# Patient Record
Sex: FEMALE | Race: WHITE | NOT HISPANIC OR LATINO | Employment: FULL TIME | URBAN - METROPOLITAN AREA
[De-identification: names, ages, dates, MRNs, and addresses within clinical notes are randomized per-mention and may not be internally consistent; named-entity substitution may affect disease eponyms.]

---

## 2020-08-17 ENCOUNTER — TELEPHONE (OUTPATIENT)
Dept: GASTROENTEROLOGY | Facility: CLINIC | Age: 45
End: 2020-08-17

## 2020-08-17 NOTE — TELEPHONE ENCOUNTER
Called and spoke to pt.  Informed pt appt on 08'/17 will be cancelled due to provider being in procedures on this date.  Offered pt sooner appt with NP/fellow.  Pt declined and states she wants to be scheduled with Dr. Landon.  Pt rescheduled for next available being she can not be seen until after 9:30 am.  Pt appreciated the call.

## 2020-11-12 ENCOUNTER — OFFICE VISIT (OUTPATIENT)
Dept: GASTROENTEROLOGY | Facility: CLINIC | Age: 45
End: 2020-11-12
Payer: COMMERCIAL

## 2020-11-12 ENCOUNTER — LAB VISIT (OUTPATIENT)
Dept: LAB | Facility: HOSPITAL | Age: 45
End: 2020-11-12
Attending: INTERNAL MEDICINE
Payer: COMMERCIAL

## 2020-11-12 VITALS
BODY MASS INDEX: 18.64 KG/M2 | WEIGHT: 125.88 LBS | SYSTOLIC BLOOD PRESSURE: 103 MMHG | DIASTOLIC BLOOD PRESSURE: 71 MMHG | HEART RATE: 95 BPM | HEIGHT: 69 IN

## 2020-11-12 DIAGNOSIS — R10.9 ABDOMINAL CRAMPING: ICD-10-CM

## 2020-11-12 DIAGNOSIS — Z87.19 HISTORY OF GASTRITIS: ICD-10-CM

## 2020-11-12 DIAGNOSIS — Z86.010 HISTORY OF COLON POLYPS: ICD-10-CM

## 2020-11-12 DIAGNOSIS — Z80.8 FAMILY HISTORY OF THYROID CANCER: ICD-10-CM

## 2020-11-12 DIAGNOSIS — R19.7 DIARRHEA, UNSPECIFIED TYPE: Primary | ICD-10-CM

## 2020-11-12 DIAGNOSIS — R15.2 FECAL URGENCY: ICD-10-CM

## 2020-11-12 DIAGNOSIS — R19.7 DIARRHEA, UNSPECIFIED TYPE: ICD-10-CM

## 2020-11-12 DIAGNOSIS — Z83.79 FAMILY HISTORY OF CELIAC SPRUE: ICD-10-CM

## 2020-11-12 LAB
ALBUMIN SERPL BCP-MCNC: 4.1 G/DL (ref 3.5–5.2)
ALP SERPL-CCNC: 49 U/L (ref 55–135)
ALT SERPL W/O P-5'-P-CCNC: 15 U/L (ref 10–44)
ANION GAP SERPL CALC-SCNC: 9 MMOL/L (ref 8–16)
AST SERPL-CCNC: 19 U/L (ref 10–40)
BASOPHILS # BLD AUTO: 0.08 K/UL (ref 0–0.2)
BASOPHILS NFR BLD: 1.3 % (ref 0–1.9)
BILIRUB DIRECT SERPL-MCNC: 0.2 MG/DL (ref 0.1–0.3)
BILIRUB SERPL-MCNC: 0.4 MG/DL (ref 0.1–1)
BUN SERPL-MCNC: 11 MG/DL (ref 6–20)
CALCIUM SERPL-MCNC: 9.5 MG/DL (ref 8.7–10.5)
CHLORIDE SERPL-SCNC: 105 MMOL/L (ref 95–110)
CO2 SERPL-SCNC: 27 MMOL/L (ref 23–29)
CREAT SERPL-MCNC: 0.8 MG/DL (ref 0.5–1.4)
DIFFERENTIAL METHOD: NORMAL
EOSINOPHIL # BLD AUTO: 0.2 K/UL (ref 0–0.5)
EOSINOPHIL NFR BLD: 3.3 % (ref 0–8)
ERYTHROCYTE [DISTWIDTH] IN BLOOD BY AUTOMATED COUNT: 11.9 % (ref 11.5–14.5)
EST. GFR  (AFRICAN AMERICAN): >60 ML/MIN/1.73 M^2
EST. GFR  (NON AFRICAN AMERICAN): >60 ML/MIN/1.73 M^2
FERRITIN SERPL-MCNC: 31 NG/ML (ref 20–300)
GLUCOSE SERPL-MCNC: 81 MG/DL (ref 70–110)
HCG INTACT+B SERPL-ACNC: <1.2 MIU/ML
HCT VFR BLD AUTO: 45.4 % (ref 37–48.5)
HGB BLD-MCNC: 15.2 G/DL (ref 12–16)
IGA SERPL-MCNC: 194 MG/DL (ref 40–350)
IMM GRANULOCYTES # BLD AUTO: 0.02 K/UL (ref 0–0.04)
IMM GRANULOCYTES NFR BLD AUTO: 0.3 % (ref 0–0.5)
IRON SERPL-MCNC: 81 UG/DL (ref 30–160)
LIPASE SERPL-CCNC: 48 U/L (ref 4–60)
LYMPHOCYTES # BLD AUTO: 1.8 K/UL (ref 1–4.8)
LYMPHOCYTES NFR BLD: 28.7 % (ref 18–48)
MCH RBC QN AUTO: 30.1 PG (ref 27–31)
MCHC RBC AUTO-ENTMCNC: 33.5 G/DL (ref 32–36)
MCV RBC AUTO: 90 FL (ref 82–98)
MONOCYTES # BLD AUTO: 0.4 K/UL (ref 0.3–1)
MONOCYTES NFR BLD: 5.8 % (ref 4–15)
NEUTROPHILS # BLD AUTO: 3.9 K/UL (ref 1.8–7.7)
NEUTROPHILS NFR BLD: 60.6 % (ref 38–73)
NRBC BLD-RTO: 0 /100 WBC
PLATELET # BLD AUTO: 252 K/UL (ref 150–350)
PMV BLD AUTO: 9.7 FL (ref 9.2–12.9)
POTASSIUM SERPL-SCNC: 3.7 MMOL/L (ref 3.5–5.1)
PROT SERPL-MCNC: 7.4 G/DL (ref 6–8.4)
RBC # BLD AUTO: 5.05 M/UL (ref 4–5.4)
SATURATED IRON: 17 % (ref 20–50)
SODIUM SERPL-SCNC: 141 MMOL/L (ref 136–145)
TOTAL IRON BINDING CAPACITY: 477 UG/DL (ref 250–450)
TRANSFERRIN SERPL-MCNC: 322 MG/DL (ref 200–375)
TSH SERPL DL<=0.005 MIU/L-ACNC: 1.26 UIU/ML (ref 0.4–4)
WBC # BLD AUTO: 6.37 K/UL (ref 3.9–12.7)

## 2020-11-12 PROCEDURE — 84307 ASSAY OF SOMATOSTATIN: CPT

## 2020-11-12 PROCEDURE — 80074 ACUTE HEPATITIS PANEL: CPT

## 2020-11-12 PROCEDURE — 1125F PR PAIN SEVERITY QUANTIFIED, PAIN PRESENT: ICD-10-PCS | Mod: S$GLB,,, | Performed by: INTERNAL MEDICINE

## 2020-11-12 PROCEDURE — 99999 PR PBB SHADOW E&M-EST. PATIENT-LVL III: ICD-10-PCS | Mod: PBBFAC,,, | Performed by: INTERNAL MEDICINE

## 2020-11-12 PROCEDURE — 83540 ASSAY OF IRON: CPT

## 2020-11-12 PROCEDURE — 82941 ASSAY OF GASTRIN: CPT

## 2020-11-12 PROCEDURE — 85025 COMPLETE CBC W/AUTO DIFF WBC: CPT

## 2020-11-12 PROCEDURE — 82728 ASSAY OF FERRITIN: CPT

## 2020-11-12 PROCEDURE — 36415 COLL VENOUS BLD VENIPUNCTURE: CPT

## 2020-11-12 PROCEDURE — 99204 PR OFFICE/OUTPT VISIT, NEW, LEVL IV, 45-59 MIN: ICD-10-PCS | Mod: S$GLB,,, | Performed by: INTERNAL MEDICINE

## 2020-11-12 PROCEDURE — 99204 OFFICE O/P NEW MOD 45 MIN: CPT | Mod: S$GLB,,, | Performed by: INTERNAL MEDICINE

## 2020-11-12 PROCEDURE — 1125F AMNT PAIN NOTED PAIN PRSNT: CPT | Mod: S$GLB,,, | Performed by: INTERNAL MEDICINE

## 2020-11-12 PROCEDURE — 84586 ASSAY OF VIP: CPT

## 2020-11-12 PROCEDURE — 80076 HEPATIC FUNCTION PANEL: CPT

## 2020-11-12 PROCEDURE — 86706 HEP B SURFACE ANTIBODY: CPT

## 2020-11-12 PROCEDURE — 80048 BASIC METABOLIC PNL TOTAL CA: CPT

## 2020-11-12 PROCEDURE — 86790 VIRUS ANTIBODY NOS: CPT

## 2020-11-12 PROCEDURE — 86316 IMMUNOASSAY TUMOR OTHER: CPT

## 2020-11-12 PROCEDURE — 99999 PR PBB SHADOW E&M-EST. PATIENT-LVL III: CPT | Mod: PBBFAC,,, | Performed by: INTERNAL MEDICINE

## 2020-11-12 PROCEDURE — 83520 IMMUNOASSAY QUANT NOS NONAB: CPT | Mod: 59

## 2020-11-12 PROCEDURE — 82308 ASSAY OF CALCITONIN: CPT

## 2020-11-12 PROCEDURE — 3008F BODY MASS INDEX DOCD: CPT | Mod: CPTII,S$GLB,, | Performed by: INTERNAL MEDICINE

## 2020-11-12 PROCEDURE — 83519 RIA NONANTIBODY: CPT

## 2020-11-12 PROCEDURE — 83516 IMMUNOASSAY NONANTIBODY: CPT

## 2020-11-12 PROCEDURE — 82784 ASSAY IGA/IGD/IGG/IGM EACH: CPT

## 2020-11-12 PROCEDURE — 3008F PR BODY MASS INDEX (BMI) DOCUMENTED: ICD-10-PCS | Mod: CPTII,S$GLB,, | Performed by: INTERNAL MEDICINE

## 2020-11-12 PROCEDURE — 83690 ASSAY OF LIPASE: CPT

## 2020-11-12 PROCEDURE — 84443 ASSAY THYROID STIM HORMONE: CPT

## 2020-11-12 PROCEDURE — 84702 CHORIONIC GONADOTROPIN TEST: CPT

## 2020-11-12 RX ORDER — LOTEPREDNOL ETABONATE 5 MG/G
GEL OPHTHALMIC
COMMUNITY
Start: 2020-10-27 | End: 2022-06-30 | Stop reason: CLARIF

## 2020-11-12 RX ORDER — NEOMYCIN SULFATE, POLYMYXIN B SULFATE AND DEXAMETHASONE 3.5; 10000; 1 MG/ML; [USP'U]/ML; MG/ML
SUSPENSION/ DROPS OPHTHALMIC
COMMUNITY
Start: 2020-10-28 | End: 2022-06-30 | Stop reason: CLARIF

## 2020-11-12 RX ORDER — AZELASTINE 1 MG/ML
SPRAY, METERED NASAL
COMMUNITY
Start: 2020-11-03

## 2020-11-12 RX ORDER — CLINDAMYCIN PHOSPHATE 100 MG/1
SUPPOSITORY VAGINAL
COMMUNITY
End: 2021-04-14

## 2020-11-12 RX ORDER — CHLORDIAZEPOXIDE HYDROCHLORIDE AND CLIDINIUM BROMIDE 5; 2.5 MG/1; MG/1
CAPSULE ORAL
COMMUNITY
Start: 2020-10-27 | End: 2021-04-14

## 2020-11-12 RX ORDER — LISDEXAMFETAMINE DIMESYLATE 20 MG/1
30 CAPSULE ORAL
COMMUNITY
Start: 2020-11-08 | End: 2022-11-03

## 2020-11-12 RX ORDER — LORATADINE 10 MG/1
TABLET ORAL
COMMUNITY

## 2020-11-12 RX ORDER — ALBUTEROL SULFATE 90 UG/1
AEROSOL, METERED RESPIRATORY (INHALATION)
COMMUNITY

## 2020-11-12 RX ORDER — LIFITEGRAST 50 MG/ML
SOLUTION/ DROPS OPHTHALMIC
COMMUNITY
Start: 2020-09-28

## 2020-11-12 RX ORDER — DESOGESTREL AND ETHINYL ESTRADIOL 0.15-0.03
KIT ORAL
COMMUNITY
Start: 2020-11-09 | End: 2022-08-24

## 2020-11-12 RX ORDER — HYOSCYAMINE SULFATE 0.12 MG/1
0.12 TABLET SUBLINGUAL EVERY 12 HOURS PRN
Qty: 60 TABLET | Refills: 1 | Status: SHIPPED | OUTPATIENT
Start: 2020-11-12 | End: 2020-12-09

## 2020-11-12 RX ORDER — CLOTRIMAZOLE AND BETAMETHASONE DIPROPIONATE 10; .64 MG/G; MG/G
CREAM TOPICAL
COMMUNITY
End: 2022-11-03

## 2020-11-12 NOTE — Clinical Note
Please order lab work today orders placed    Please order stools x4  least 2 days apart.  One stool needs to be a solid stool please label that fecal elastase.    Orders for EGD placed    Please schedule patient to return to GI clinic in 8 weeks for follow-up.

## 2020-11-12 NOTE — PROGRESS NOTES
[facility] Consultation Note    Reason for Consult:  The primary encounter diagnosis was Diarrhea, unspecified type. Diagnoses of Fecal urgency, Abdominal cramping, Family history of celiac sprue, History of gastritis, Family history of thyroid cancer, and History of colon polyps were also pertinent to this visit.    PCP:   Cesar Zambrano   No address on file    Referring MD:  Fabianerral Self  No address on file    Initial History of Present Illness (HPI):  This is a 45 y.o. female here for evaluation of history of diarrhea and abdominal cramps.  Patient states her symptoms started about 8 years ago had EGD at that time was told she had gastritis no H pylori she did not bring a copy of that report we asked her if she brought a copy of that report would be happy to review it.  She did undergo recent colonoscopy she says on January 27, 2020 by her GI doctor back home she said he took random biopsies in everything looked normal we do not have a copy of that report if she brings hep I will be happy to review it as well.  She says she started doing well maybe about 4 years after her symptoms started and then 2 years ago her symptoms started again.  She says some it is mainly diarrhea she will have 3 bowel movements a day that are loose and watery never sees blood.  This will happen for 2 or 3 days a week and then resolve and then the cycle returns.  She has never been able to identify what is foods make it worse she thinks she could be lactose intolerant but never tested she has tried Lactaid pills over-the-counter she has tried many probiotics over-the-counter with no real benefit she has tried Bentyl in the past with not a lot of benefit she has never try Levsin before.  No recent antibiotic use no recent [place].  She has 2 children in their healthy she has a fiancee and he is healthy.  She does have a history of pelvic floor dysfunction which she  periodically gets physical therapy for few times a year for last 15 years and relieves her dysuria or symptoms.  She denies any flushing wheezing or palpitation no weight loss no GERD no dysphagia no nausea or vomiting.  She still menstruating.  He is on birth control pills.  No heavy menstrual bleeding.    Abdominal pain -as above  Reflux - no  Dysphagia - no   Bowel habits -episodic diarrhea and fecal urgency.  After she eats  GI bleeding - none  NSAID usage - occasional    Interval HPI 11/12/2020:  The patient's last visit with me was on Visit date not found.      ROS:  Constitutional: No fevers, chills, No weight loss  ENT:  No heartburn no dysphagia no odynophagia no hoarseness  CV: No chest pain, no palpitation  Pulm: No cough, No shortness of breath, no wheezing  Ophtho: No vision changes  GI: see HPI  Derm: No rash, no itching  Heme: No lymphadenopathy, No easy bruising  MSK: No significant arthritis but she has some arthritis that she takes NSAIDs for  : No dysuria, No hematuria  Endo: No hot or cold intolerance  Neuro: No syncope, No seizure, no strokes  Psych: No uncontrolled anxiety, No uncontrolled depression    Medical History:  has a past medical history of Chronic diarrhea, GERD (gastroesophageal reflux disease), and Irritable bowel syndrome.    Surgical History:  has a past surgical history that includes Colonoscopy and Upper gastrointestinal endoscopy.    Family History: family history is not on file..     Social History:  reports that she has never smoked. She has never used smokeless tobacco. She reports that she does not drink alcohol.  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  She was  once for 6 years  she is single but currently engaged to get  she has 2 healthy children born in 2008 in 2012.    Review of patient's allergies indicates:  No Known Allergies    Medication List with Changes/Refills   Current Medications    ALBUTEROL (PROAIR HFA) 90 MCG/ACTUATION  "INHALER    ProAir HFA 90 mcg/actuation aerosol inhaler    AZELASTINE (ASTELIN) 137 MCG (0.1 %) NASAL SPRAY    USE 1 SPRAY TWICE A DAY INTO EACH NOSTRIL    CHLORDIAZEPOXIDE-CLIDINIUM 5-2.5 MG (LIBRAX) 5-2.5 MG CAP    TAKE 1 CAPSULE BY MOUTH EVERYDAY AT BEDTIME    CLINDAMYCIN (CLEOCIN) 100 MG VAGINAL SUPPOSITORY    Cleocin 100 mg vaginal suppository    CLOTRIMAZOLE-BETAMETHASONE 1-0.05% (LOTRISONE) CREAM    clotrimazole-betamethasone 1 %-0.05 % topical cream    ENSKYCE 0.15-0.03 MG PER TABLET        LORATADINE (CLARITIN) 10 MG TABLET    Claritin    LOTEMAX 0.5 % DRPG    INSTILL 1 DROP BOTH EYES 3 TIMES A DAY    NEOMYCIN-POLYMYXIN-DEXAMETHASONE (MAXITROL) 3.5MG/ML-10,000 UNIT/ML-0.1 % DRPS    INSTILL 1 DROP FOUR TIMES A DAY TO AFFECTED EYE    VYVANSE 20 MG CAPSULE        XIIDRA 5 % DPET    INSTILL 1 DROP TWICE A DAY INTO BOTH EYES         Objective Findings:    Vital Signs:  /71   Pulse 95   Ht 5' 9" (1.753 m)   Wt 57.1 kg (125 lb 14.1 oz)   BMI 18.59 kg/m²   Body mass index is 18.59 kg/m².    Physical Exam:  General Appearance: Well appearing in no acute distress  Eyes:    No scleral icterus  ENT:  No lesions or masses   Lungs: CTA bilaterally, no wheezes, no rhonchi, no rales  Heart:  S1, S2 normal, no murmurs heard  Abdomen:  Non distended, soft, no guarding, no rebound, no tenderness, no appreciated ascites, no bruits, no hepatosplenomegaly,  No CVA tenderness, no appreciated hernias  Musculoskeletal:  No major joint deformities  Skin: No petechiae or rash on exposed skin areas  Neurologic:  Alert and oriented x4  Psychiatric:  Normal speech mentation and affect    Labs:  No results found for: WBC, HGB, HCT, PLT, CHOL, TRIG, HDL, LDLDIRECT, ALT, AST, NA, K, CL, CREATININE, BUN, CO2, TSH, PSA, INR, GLUF, HGBA1C, MICROALBUR            Medical Decision Making:  Recommend patient bring a copy of her EGD and colonoscopy report path for me to review  She has tried Bentyl did work  Librax diarrhea improved  She " is on Vyvanse for attention deficit disorder  She said her colon polyp was a serrated adenoma and hyperplastic polyp  She said she had gestational diabetes during 1 of her pregnancies  EGD talk given  Stool studies talk given  Levsin talk given  Viral hepatitis talk given vaccination talk given  Assessment:  1. Diarrhea, unspecified type    2. Fecal urgency    3. Abdominal cramping    4. Family history of celiac sprue    5. History of gastritis    6. Family history of thyroid cancer    7. History of colon polyps         Recommendations:   1.  For chronic diarrhea recommend lab work today in stool studies x4  least 2 days apart 1 stool being a solid stool for fecal elastase will recommend EGD as well.  Recommend patient bring copy of her colonoscopy report path for me to review.  2.  Patient can try Levsin sublingually q.12 hours p.r.n. for abdominal cramps.  3.  Will screen for viral hepatitis.  Will recommend hepatitis A and B vaccines if not immune.  4.  Return GI clinic in 8 weeks for follow-up.    Follow up in about 8 weeks (around 1/7/2021).      Order summary:  Orders Placed This Encounter    Stool culture    Clostridium difficile EIA    Calcitonin    Chromogranin A    Gastrin    Vasoactive Intes. Polypep 8150    Somatostatin    Pancreatic Polypeptide    TRYPTASE    Gastrointestinal Pathogens Panel, PCR    Micropsoridia species, PCR    Cyclospora    Pancreatic elastase, fecal    Fecal fat, qualitative    Stool Exam-Ova,Cysts,Parasites    Stool Exam-Ova,Cysts,Parasites    Stool Exam-Ova,Cysts,Parasites    Giardia / Cryptosporidum, EIA    TISSUE TRANSGLUTAMINASE (TTG), IGA    IgA    TSH    Hepatic Function Panel    Basic Metabolic Panel    Lipase    HCG, Quantitative    CBC Auto Differential    Ferritin    Iron and TIBC    Hepatitis Panel, Acute    Hepatitis B Surface Antigen    HEPATITIS A ANTIBODY, IGG    Hepatitis B Surface Antibody, Qual/Quant    Case request GI: EGD  (ESOPHAGOGASTRODUODENOSCOPY)         Thank you so much for allowing me to participate in the care of Linda Landon MD

## 2020-11-13 LAB
HAV IGM SERPL QL IA: NEGATIVE
HBV CORE IGM SERPL QL IA: NEGATIVE
HBV SURFACE AG SERPL QL IA: NEGATIVE
HBV SURFACE AG SERPL QL IA: NEGATIVE
HCV AB SERPL QL IA: NEGATIVE
HEPATITIS A ANTIBODY, IGG: NEGATIVE

## 2020-11-16 LAB
CALCIT SERPL-MCNC: <5 PG/ML
CGA SERPL-MCNC: 34 NG/ML (ref 0–103)
GASTRIN SERPL-MCNC: 16 PG/ML
HBV SURFACE AB SER QL IA: POSITIVE
HBV SURFACE AB SERPL IA-ACNC: 31 MIU/ML
TTG IGA SER-ACNC: 5 UNITS

## 2020-11-17 ENCOUNTER — PATIENT MESSAGE (OUTPATIENT)
Dept: GASTROENTEROLOGY | Facility: CLINIC | Age: 45
End: 2020-11-17

## 2020-11-17 LAB — TRYPTASE LEVEL: 3.9 NG/ML

## 2020-11-18 ENCOUNTER — LAB VISIT (OUTPATIENT)
Dept: LAB | Facility: HOSPITAL | Age: 45
End: 2020-11-18
Attending: INTERNAL MEDICINE
Payer: COMMERCIAL

## 2020-11-18 ENCOUNTER — PATIENT MESSAGE (OUTPATIENT)
Dept: GASTROENTEROLOGY | Facility: CLINIC | Age: 45
End: 2020-11-18

## 2020-11-18 DIAGNOSIS — K58.2 IRRITABLE BOWEL SYNDROME WITH BOTH CONSTIPATION AND DIARRHEA: Primary | ICD-10-CM

## 2020-11-18 DIAGNOSIS — Z87.19 HISTORY OF GASTRITIS: ICD-10-CM

## 2020-11-18 DIAGNOSIS — Z80.8 FAMILY HISTORY OF THYROID CANCER: ICD-10-CM

## 2020-11-18 DIAGNOSIS — Z83.79 FAMILY HISTORY OF CELIAC SPRUE: ICD-10-CM

## 2020-11-18 DIAGNOSIS — R10.9 ABDOMINAL CRAMPING: ICD-10-CM

## 2020-11-18 DIAGNOSIS — R19.7 DIARRHEA, UNSPECIFIED TYPE: ICD-10-CM

## 2020-11-18 DIAGNOSIS — R15.2 FECAL URGENCY: ICD-10-CM

## 2020-11-18 PROCEDURE — 87329 GIARDIA AG IA: CPT

## 2020-11-18 PROCEDURE — 87209 SMEAR COMPLEX STAIN: CPT

## 2020-11-18 PROCEDURE — 87045 FECES CULTURE AEROBIC BACT: CPT

## 2020-11-18 PROCEDURE — 89125 SPECIMEN FAT STAIN: CPT

## 2020-11-18 PROCEDURE — 87046 STOOL CULTR AEROBIC BACT EA: CPT | Mod: 59

## 2020-11-18 PROCEDURE — 87427 SHIGA-LIKE TOXIN AG IA: CPT | Mod: 59

## 2020-11-18 PROCEDURE — 82656 EL-1 FECAL QUAL/SEMIQ: CPT

## 2020-11-19 ENCOUNTER — TELEPHONE (OUTPATIENT)
Dept: ENDOSCOPY | Facility: HOSPITAL | Age: 45
End: 2020-11-19

## 2020-11-19 LAB
CRYPTOSP AG STL QL IA: NEGATIVE
G LAMBLIA AG STL QL IA: NEGATIVE
VASOACTIVE INTESTINAL POLYPEPTIDE PLASMA: <50 PG/ML

## 2020-11-19 NOTE — TELEPHONE ENCOUNTER
"----- Message from Davion Landon MD sent at 11/18/2020  9:06 AM CST -----  Nicki  please tell patient that their Hepatitis A, B and C labs are negative but they have "No" immunity to hepatitis A or C.    She has immunity to hepatitis B which is a good thing.     There is currently No vaccination yet for Hepatitis C.    There is vaccinations for Hepatitis A,  and Recommend the Hepatitis A vaccination series.     Hepatitis A vaccination series is a 2 part vaccine series - Day 1, and then again in 6-months from first one.      Orders were  Placed.    Nicki- please tell patient that they are iron deficient but not anemic and recommend that they take ferrous sulfate one 325mg pill every 12 hours for next 4 months.    Please order repeat fasting Hemoglobin, Iron/TIBC, and Ferritin in 8 weeks - Orders placed.  "

## 2020-11-20 LAB
E COLI SXT1 STL QL IA: NEGATIVE
E COLI SXT2 STL QL IA: NEGATIVE
ELASTASE 1, FECAL: 467 MCG/G
FAT STL SUDAN IV STN: NORMAL
O+P STL MICRO: NORMAL
PANC POLYPEPT SERPL-MCNC: 219 PG/ML

## 2020-11-21 DIAGNOSIS — E61.1 IRON DEFICIENCY: Primary | ICD-10-CM

## 2020-11-21 RX ORDER — FERROUS SULFATE 325(65) MG
325 TABLET ORAL DAILY
Qty: 90 TABLET | Refills: 1 | Status: SHIPPED | OUTPATIENT
Start: 2020-11-21 | End: 2021-04-14 | Stop reason: SDUPTHER

## 2020-11-22 ENCOUNTER — PATIENT MESSAGE (OUTPATIENT)
Dept: GASTROENTEROLOGY | Facility: CLINIC | Age: 45
End: 2020-11-22

## 2020-11-23 LAB — BACTERIA STL CULT: NORMAL

## 2020-11-24 ENCOUNTER — DOCUMENTATION ONLY (OUTPATIENT)
Dept: GASTROENTEROLOGY | Facility: HOSPITAL | Age: 45
End: 2020-11-24

## 2020-11-24 ENCOUNTER — PATIENT MESSAGE (OUTPATIENT)
Dept: GASTROENTEROLOGY | Facility: CLINIC | Age: 45
End: 2020-11-24

## 2020-11-24 ENCOUNTER — TELEPHONE (OUTPATIENT)
Dept: ENDOSCOPY | Facility: HOSPITAL | Age: 45
End: 2020-11-24

## 2020-11-24 NOTE — TELEPHONE ENCOUNTER
----- Message from Davion Landon MD sent at 11/21/2020  8:54 AM CST -----  Nicki- please tell patient that they are iron deficient but not anemic and recommend that they take ferrous sulfate one 325mg pill every once daily for next 6 months.    Please order repeat fasting Hemoglobin, Iron/TIBC, and Ferritin in 12 weeks - Orders placed.

## 2020-11-25 ENCOUNTER — LAB VISIT (OUTPATIENT)
Dept: LAB | Facility: HOSPITAL | Age: 45
End: 2020-11-25
Attending: INTERNAL MEDICINE
Payer: COMMERCIAL

## 2020-11-25 DIAGNOSIS — Z87.19 HISTORY OF GASTRITIS: ICD-10-CM

## 2020-11-25 DIAGNOSIS — R19.7 DIARRHEA, UNSPECIFIED TYPE: ICD-10-CM

## 2020-11-25 DIAGNOSIS — Z83.79 FAMILY HISTORY OF CELIAC SPRUE: ICD-10-CM

## 2020-11-25 DIAGNOSIS — R15.2 FECAL URGENCY: ICD-10-CM

## 2020-11-25 DIAGNOSIS — Z80.8 FAMILY HISTORY OF THYROID CANCER: ICD-10-CM

## 2020-11-25 DIAGNOSIS — R10.9 ABDOMINAL CRAMPING: ICD-10-CM

## 2020-11-25 PROCEDURE — 87507 IADNA-DNA/RNA PROBE TQ 12-25: CPT

## 2020-11-25 PROCEDURE — 87209 SMEAR COMPLEX STAIN: CPT

## 2020-11-25 PROCEDURE — 87798 DETECT AGENT NOS DNA AMP: CPT

## 2020-11-25 NOTE — PROGRESS NOTES
Mushtaq  Based on your 1 cm polyp in your colon being a sessile serrated polyp the recommend for your next surveillance colonoscopy would be 3 years per the current guidelines (not 5 years).  ===View-only below this line===      ----- Message -----       From:Linda Acosta       Sent:11/22/2020 11:32 AM CST         To:Davion Landon MD    Subject:Test Results    Hello Dr. Landon,    I've attached results from my colonoscopy.    Thanks,  Jenni Acosat

## 2020-11-25 NOTE — PROGRESS NOTES
Thank you for sending me a copy you EGD and colonoscopy pathology are reviewed it.  Linda It does not look like on your EGD or colon pathology that they took random biopsies to rule out celiac sprue or microscopic colitis.  That would be to be done to fully evaluate your diarrhea.  Please let me know if you would like me to schedule you for an EGD colonoscopy for further evaluation.

## 2020-11-28 LAB — SOMATOSTAT PLAS-MCNC: 22 PG/ML

## 2020-11-29 LAB
ENCEPHALITOZOON BIENEUSI: NEGATIVE
ENCEPHALITOZOON SPECIES: NEGATIVE
MICROSPORIDIA SPECIMEN SOURCE: NORMAL

## 2020-11-30 ENCOUNTER — LAB VISIT (OUTPATIENT)
Dept: LAB | Facility: HOSPITAL | Age: 45
End: 2020-11-30
Attending: INTERNAL MEDICINE
Payer: COMMERCIAL

## 2020-11-30 DIAGNOSIS — Z87.19 HISTORY OF GASTRITIS: ICD-10-CM

## 2020-11-30 DIAGNOSIS — Z80.8 FAMILY HISTORY OF THYROID CANCER: ICD-10-CM

## 2020-11-30 DIAGNOSIS — R15.2 FECAL URGENCY: ICD-10-CM

## 2020-11-30 DIAGNOSIS — R10.9 ABDOMINAL CRAMPING: ICD-10-CM

## 2020-11-30 DIAGNOSIS — R19.7 DIARRHEA, UNSPECIFIED TYPE: ICD-10-CM

## 2020-11-30 DIAGNOSIS — Z83.79 FAMILY HISTORY OF CELIAC SPRUE: ICD-10-CM

## 2020-11-30 LAB — O+P STL MICRO: NORMAL

## 2020-11-30 PROCEDURE — 87209 SMEAR COMPLEX STAIN: CPT

## 2020-12-01 LAB
GPP - ADENOVIRUS 40/41: NOT DETECTED
GPP - CAMPYLOBACTER: NOT DETECTED
GPP - CRYPTOSPORIDIUM: NOT DETECTED
GPP - E COLI O157: NOT DETECTED
GPP - ENTAMOEBA HISTOLYTICA: NOT DETECTED
GPP - ENTEROTOXIGENIC E COLI (ETEC): NOT DETECTED
GPP - GIARDIA LAMBLIA: NOT DETECTED
GPP - NOROVIRUS GI/GII: NOT DETECTED
GPP - ROTAVIRUS A: NOT DETECTED
GPP - SALMONELLA: NOT DETECTED
GPP - SHIGELLA: NOT DETECTED
GPP - VIBRIO CHOLERA: NOT DETECTED
GPP - YERSINIA ENTEROCOLITICA: NOT DETECTED
LACTATE PLASV-SCNC: NOT DETECTED MMOL/L

## 2020-12-02 LAB — O+P STL MICRO: NORMAL

## 2020-12-09 ENCOUNTER — PATIENT MESSAGE (OUTPATIENT)
Dept: GASTROENTEROLOGY | Facility: CLINIC | Age: 45
End: 2020-12-09

## 2020-12-09 DIAGNOSIS — R19.7 DIARRHEA, UNSPECIFIED TYPE: ICD-10-CM

## 2020-12-09 DIAGNOSIS — K58.9 IRRITABLE BOWEL SYNDROME, UNSPECIFIED TYPE: Primary | ICD-10-CM

## 2020-12-10 DIAGNOSIS — R10.13 DYSPEPSIA: ICD-10-CM

## 2020-12-10 DIAGNOSIS — K21.9 GASTROESOPHAGEAL REFLUX DISEASE, UNSPECIFIED WHETHER ESOPHAGITIS PRESENT: Primary | ICD-10-CM

## 2020-12-10 RX ORDER — RABEPRAZOLE SODIUM 20 MG/1
20 TABLET, DELAYED RELEASE ORAL
Qty: 90 TABLET | Refills: 0 | Status: SHIPPED | OUTPATIENT
Start: 2020-12-10 | End: 2021-03-02

## 2020-12-16 ENCOUNTER — TELEPHONE (OUTPATIENT)
Dept: GASTROENTEROLOGY | Facility: CLINIC | Age: 45
End: 2020-12-16

## 2020-12-16 DIAGNOSIS — K58.9 IRRITABLE BOWEL SYNDROME, UNSPECIFIED TYPE: Primary | ICD-10-CM

## 2020-12-16 DIAGNOSIS — R19.7 DIARRHEA, UNSPECIFIED TYPE: ICD-10-CM

## 2020-12-16 NOTE — TELEPHONE ENCOUNTER
Dr. Landon  Pt went to the lab and dropped off a wendy sample   Lab is requesting orders   No orders was in the system or in message to patient   Lab states stool has been discarded and that pt needs to resubmit  Please advise

## 2020-12-16 NOTE — TELEPHONE ENCOUNTER
----- Message from Magali Dumont sent at 12/16/2020 12:38 PM CST -----  Regarding: Orders  Contact: [facility]cin fern[facility]dez[phone]  Calling in regards to getting orders for stool sample dropped off at lab today. Please call and advise

## 2020-12-17 ENCOUNTER — TELEPHONE (OUTPATIENT)
Dept: GASTROENTEROLOGY | Facility: CLINIC | Age: 45
End: 2020-12-17

## 2020-12-17 NOTE — TELEPHONE ENCOUNTER
Ma spoke with pt   Pt was informed that she needs to resubmit her stool  Pt states she will get kit from the lab in [facility]  Pt verbalized understanding

## 2020-12-17 NOTE — TELEPHONE ENCOUNTER
----- Message from Cheyanne Isaac sent at 12/17/2020  9:33 AM CST -----  Linda Acosta calling regarding a miss call. [phone]

## 2020-12-17 NOTE — TELEPHONE ENCOUNTER
Ma called pt   No answer message left on pt vm   Pt needs to resubmit stool   No orders was placed on yesterday and lab discarded stool

## 2021-02-03 ENCOUNTER — PATIENT MESSAGE (OUTPATIENT)
Dept: GASTROENTEROLOGY | Facility: CLINIC | Age: 46
End: 2021-02-03

## 2021-02-07 ENCOUNTER — DOCUMENTATION ONLY (OUTPATIENT)
Dept: GASTROENTEROLOGY | Facility: CLINIC | Age: 46
End: 2021-02-07

## 2021-02-25 ENCOUNTER — TELEPHONE (OUTPATIENT)
Dept: GASTROENTEROLOGY | Facility: CLINIC | Age: 46
End: 2021-02-25

## 2021-02-25 ENCOUNTER — PATIENT MESSAGE (OUTPATIENT)
Dept: GASTROENTEROLOGY | Facility: CLINIC | Age: 46
End: 2021-02-25

## 2021-03-11 ENCOUNTER — TELEPHONE (OUTPATIENT)
Dept: ENDOSCOPY | Facility: HOSPITAL | Age: 46
End: 2021-03-11

## 2021-03-18 ENCOUNTER — PATIENT MESSAGE (OUTPATIENT)
Dept: GASTROENTEROLOGY | Facility: CLINIC | Age: 46
End: 2021-03-18

## 2021-04-14 ENCOUNTER — OFFICE VISIT (OUTPATIENT)
Dept: GASTROENTEROLOGY | Facility: CLINIC | Age: 46
End: 2021-04-14
Payer: COMMERCIAL

## 2021-04-14 VITALS — HEIGHT: 69 IN | BODY MASS INDEX: 18.66 KG/M2 | WEIGHT: 126 LBS

## 2021-04-14 DIAGNOSIS — R10.9 ABDOMINAL CRAMPS: ICD-10-CM

## 2021-04-14 DIAGNOSIS — Z87.898 HISTORY OF DIARRHEA: Primary | ICD-10-CM

## 2021-04-14 DIAGNOSIS — E61.1 IRON DEFICIENCY: ICD-10-CM

## 2021-04-14 PROCEDURE — 3008F BODY MASS INDEX DOCD: CPT | Mod: CPTII,,, | Performed by: INTERNAL MEDICINE

## 2021-04-14 PROCEDURE — 99213 OFFICE O/P EST LOW 20 MIN: CPT | Mod: 95,,, | Performed by: INTERNAL MEDICINE

## 2021-04-14 PROCEDURE — 99213 PR OFFICE/OUTPT VISIT, EST, LEVL III, 20-29 MIN: ICD-10-PCS | Mod: 95,,, | Performed by: INTERNAL MEDICINE

## 2021-04-14 PROCEDURE — 3008F PR BODY MASS INDEX (BMI) DOCUMENTED: ICD-10-PCS | Mod: CPTII,,, | Performed by: INTERNAL MEDICINE

## 2021-04-14 RX ORDER — FERROUS SULFATE 325(65) MG
325 TABLET ORAL DAILY
Qty: 90 TABLET | Refills: 1 | Status: SHIPPED | OUTPATIENT
Start: 2021-04-14 | End: 2021-10-11

## 2021-05-06 ENCOUNTER — PATIENT MESSAGE (OUTPATIENT)
Dept: RESEARCH | Facility: HOSPITAL | Age: 46
End: 2021-05-06

## 2021-05-10 ENCOUNTER — PATIENT MESSAGE (OUTPATIENT)
Dept: RESEARCH | Facility: HOSPITAL | Age: 46
End: 2021-05-10

## 2021-06-10 ENCOUNTER — LAB VISIT (OUTPATIENT)
Dept: LAB | Facility: HOSPITAL | Age: 46
End: 2021-06-10
Attending: INTERNAL MEDICINE
Payer: COMMERCIAL

## 2021-06-10 DIAGNOSIS — E61.1 IRON DEFICIENCY: ICD-10-CM

## 2021-06-10 LAB
BASOPHILS # BLD AUTO: 0.07 K/UL (ref 0–0.2)
BASOPHILS NFR BLD: 1.2 % (ref 0–1.9)
DIFFERENTIAL METHOD: NORMAL
EOSINOPHIL # BLD AUTO: 0.1 K/UL (ref 0–0.5)
EOSINOPHIL NFR BLD: 2.3 % (ref 0–8)
ERYTHROCYTE [DISTWIDTH] IN BLOOD BY AUTOMATED COUNT: 12.2 % (ref 11.5–14.5)
FERRITIN SERPL-MCNC: 42 NG/ML (ref 20–300)
HCT VFR BLD AUTO: 43.8 % (ref 37–48.5)
HGB BLD-MCNC: 15.1 G/DL (ref 12–16)
IMM GRANULOCYTES # BLD AUTO: 0.02 K/UL (ref 0–0.04)
IMM GRANULOCYTES NFR BLD AUTO: 0.3 % (ref 0–0.5)
IRON SERPL-MCNC: 107 UG/DL (ref 30–160)
LYMPHOCYTES # BLD AUTO: 1.4 K/UL (ref 1–4.8)
LYMPHOCYTES NFR BLD: 24.8 % (ref 18–48)
MCH RBC QN AUTO: 30.7 PG (ref 27–31)
MCHC RBC AUTO-ENTMCNC: 34.5 G/DL (ref 32–36)
MCV RBC AUTO: 89 FL (ref 82–98)
MONOCYTES # BLD AUTO: 0.4 K/UL (ref 0.3–1)
MONOCYTES NFR BLD: 6.1 % (ref 4–15)
NEUTROPHILS # BLD AUTO: 3.8 K/UL (ref 1.8–7.7)
NEUTROPHILS NFR BLD: 65.3 % (ref 38–73)
NRBC BLD-RTO: 0 /100 WBC
PLATELET # BLD AUTO: 232 K/UL (ref 150–450)
PMV BLD AUTO: 9.5 FL (ref 9.2–12.9)
RBC # BLD AUTO: 4.92 M/UL (ref 4–5.4)
SATURATED IRON: 26 % (ref 20–50)
TOTAL IRON BINDING CAPACITY: 410 UG/DL (ref 250–450)
TRANSFERRIN SERPL-MCNC: 277 MG/DL (ref 200–375)
WBC # BLD AUTO: 5.77 K/UL (ref 3.9–12.7)

## 2021-06-10 PROCEDURE — 82728 ASSAY OF FERRITIN: CPT | Performed by: INTERNAL MEDICINE

## 2021-06-10 PROCEDURE — 36415 COLL VENOUS BLD VENIPUNCTURE: CPT | Performed by: INTERNAL MEDICINE

## 2021-06-10 PROCEDURE — 85025 COMPLETE CBC W/AUTO DIFF WBC: CPT | Performed by: INTERNAL MEDICINE

## 2021-06-10 PROCEDURE — 83540 ASSAY OF IRON: CPT | Performed by: INTERNAL MEDICINE

## 2021-06-11 ENCOUNTER — TELEPHONE (OUTPATIENT)
Dept: ENDOSCOPY | Facility: HOSPITAL | Age: 46
End: 2021-06-11

## 2022-03-22 ENCOUNTER — TELEPHONE (OUTPATIENT)
Dept: HEMATOLOGY/ONCOLOGY | Facility: CLINIC | Age: 47
End: 2022-03-22
Payer: COMMERCIAL

## 2022-03-23 ENCOUNTER — PATIENT MESSAGE (OUTPATIENT)
Dept: HEMATOLOGY/ONCOLOGY | Facility: CLINIC | Age: 47
End: 2022-03-23

## 2022-03-23 ENCOUNTER — TELEPHONE (OUTPATIENT)
Dept: GYNECOLOGIC ONCOLOGY | Facility: CLINIC | Age: 47
End: 2022-03-23
Payer: COMMERCIAL

## 2022-03-23 ENCOUNTER — DOCUMENTATION ONLY (OUTPATIENT)
Dept: HEMATOLOGY/ONCOLOGY | Facility: CLINIC | Age: 47
End: 2022-03-23
Payer: COMMERCIAL

## 2022-03-23 ENCOUNTER — TELEPHONE (OUTPATIENT)
Dept: GASTROENTEROLOGY | Facility: CLINIC | Age: 47
End: 2022-03-23
Payer: COMMERCIAL

## 2022-03-23 ENCOUNTER — TELEPHONE (OUTPATIENT)
Dept: HEMATOLOGY/ONCOLOGY | Facility: CLINIC | Age: 47
End: 2022-03-23
Payer: COMMERCIAL

## 2022-03-23 ENCOUNTER — OFFICE VISIT (OUTPATIENT)
Dept: HEMATOLOGY/ONCOLOGY | Facility: CLINIC | Age: 47
End: 2022-03-23
Payer: COMMERCIAL

## 2022-03-23 DIAGNOSIS — Z15.01 MONOALLELIC MUTATION OF BRCA2 GENE: ICD-10-CM

## 2022-03-23 DIAGNOSIS — Z71.83 ENCOUNTER FOR NONPROCREATIVE GENETIC COUNSELING: Primary | ICD-10-CM

## 2022-03-23 DIAGNOSIS — Z15.09 MONOALLELIC MUTATION OF BRCA2 GENE: ICD-10-CM

## 2022-03-23 PROCEDURE — 99205 PR OFFICE/OUTPT VISIT, NEW, LEVL V, 60-74 MIN: ICD-10-PCS | Mod: 95,,, | Performed by: NURSE PRACTITIONER

## 2022-03-23 PROCEDURE — 99205 OFFICE O/P NEW HI 60 MIN: CPT | Mod: 95,,, | Performed by: NURSE PRACTITIONER

## 2022-03-23 PROCEDURE — 99417 PROLNG OP E/M EACH 15 MIN: CPT | Mod: 95,,, | Performed by: NURSE PRACTITIONER

## 2022-03-23 PROCEDURE — 99417 PR PROLONGED SVC, OUTPT, W/WO DIRECT PT CONTACT,  EA ADDTL 15 MIN: ICD-10-PCS | Mod: 95,,, | Performed by: NURSE PRACTITIONER

## 2022-03-23 NOTE — TELEPHONE ENCOUNTER
Left vm to schedule genetics appt. Provided call back number.    ----- Message from Amber Last sent at 3/23/2022  8:44 AM CDT -----  Pt#[phone]    Pt states that she's returning call regarding appt for genetic testing. Please call

## 2022-03-23 NOTE — Clinical Note
Devante Caceres,  Please schedule Linda for an appointment with the [facility] given her newly diagnosed, germline BRCA2 gene mutation -- for pancreatic cancer risk management.  Referral is in.  Thank you, Marcin

## 2022-03-23 NOTE — Clinical Note
Devante Flores,  Please schedule Linda for an appointment with Dr. Mittal given her newly diagnosed, germline BRCA2 gene mutation.  Referral is in.  Thank you, Marcin

## 2022-03-23 NOTE — Clinical Note
Hi Tigist,  This very pleasant patient has a newly diagnosed, germline BRCA2 mutation.  No personal history of cancer.  She needs to get established with either SHAWNEE or Fabiana in the high-risk breast clinic for ongoing breast cancer risk management but also wants a consultation with a breast surgeon to discuss risk-reducing mastectomy in detail (she's not sure if mastectomy is desired right now but wants to learn more).  She lives in [place] but travels to [place] for work occasionally.  I just sent messages to Mark Terry and Ivette Pierson with Gyn/Onc and the pancreas clinic, respectively, to get Linda set up for those two appointments.   Do you think you could help with trying to get her high-risk breast appointment and her breast surgery appointment on a day/s when she's already coming here for another appointment?  Any help would be appreciated.  Please let me know.  Thank you, Marcin

## 2022-03-23 NOTE — PROGRESS NOTES
"Cancer Genetics  Hereditary and High-Risk Clinic  Department of Hematology and Oncology  [facility]    [facility]    Date of Service:  3/23/22  Provider:  Marcin Soto DNP    Patient Information  Name: Linda Acosta  : 1975  MRN: [id number]     Referring Provider    Aaareferral Self  No address on file    Televisit Information  The patient location is: [place], [place].  The chief complaint leading to consultation is: as below.  Visit type: audiovisual.  Face-to-face time with patient: approximately 64 minutes.  Approximately 124 minutes in total were spent on this encounter, which includes face-to-face time and non-face-to-face time preparing to see the patient (e.g., review of tests), obtaining and/or reviewing separately obtained history, documenting clinical information in the electronic or other health record, independently interpreting results (not separately reported) and communicating results to the patient/family/caregiver, or care coordination (not separately reported).  Each patient to whom he or she provides medical services by telemedicine is:  (1) informed of the relationship between the physician and patient and the respective role of any other health care provider with respect to management of the patient; and (2) notified that he or she may decline to receive medical services by telemedicine and may withdraw from such care at any time.  Notes:     SUBJECTIVE      Chief Complaint: Newly diagnosed germline BRCA2 mutation    History of Present Illness (HPI):  Linda Acosta ("Linda"), 46 y.o., assigned female sex at birth, is new to the [facility] of [facility] and to me.  She presents today to discuss her newly identified germline BRCA2 gene mutation.        Focused Medical History    Germline cancer genetic testing:  Yes - once only, as above   Cancer:  No   Colon polyp:  Yes - about 2  o One colonoscopy done, in c, at [facility] " in [place]   Other benign tumor/mass:  No   Pancreatitis:  No    Focused Surgical History   Reproductive organs intact    Ancestry   Ashkenazi Episcopal ancestry:  Yes - on paternal grandfather's side    Focused Family History   Consanguinity in ancestors:  No   Consanguinity between patient and children's father:  No   Germline cancer genetic testing:  Yes  o Paternal uncle Singh:  BRCA+   o Paternal aunt June:  Negative  o Father:  BRCA2+  o Sister:  Negative  o 3 paternal cousins (Singh's children)  - Jose:  Positive (BRCA2?)  - Mario:  Negative  - Rafaela:  Negative    Family Cancer Pedigree     Other than noted, no known history of cancer in:  maternal extended family, paternal extended family.    Review of Systems   See HPI.       OBJECTIVE     Physical Exam  Significantly limited secondary to the inherent nature of a virtual visit.  Very pleasant patient.  Constitutional       Appearance:  She appears to be in no distress.   Neurological     Mental Status:  She is alert and oriented.  Psychiatric        Mood and Affect:  She has a normal mood and affect.     Thought Content:  Thought content is normal.         Speech:  Speech is normal.     Behavior:  Behavior is normal.     Judgment:  Judgment is normal.   Genetics-specific     It is my assessment that the patient is ready to proceed with cancer genetic testing from a psychosocial perspective.    ClinVar Review      COUNSELING      Cancer Genetics    Germline cancer genetic testing is the testing of genes associated with cancer, known as cancer susceptibility genes.  Just as these genes are inherited from parents, mutations in these genes can be inherited, as well.  A mutation in a cancer susceptibility gene adversely affects the gene's ability to prevent cancer; therefore, carriers of cancer susceptibility gene mutations may be at increased risk for certain cancers.    JEN Mckeon tested positive for a germline monoallelic/heterozygous  mutation in her BRCA2 gene on recent testing, as detailed above.      The specific BRCA2 variant that she carries has numerous nomenclatures but is a known Ashkenazi Uatsdin  variant.    Male and female carriers of a BRCA2 mutation are at increased risk for breast cancer and pancreatic cancer; female carriers are also at increased risk for ovarian cancer, fallopian tube cancer, and primary peritoneal cancer; while male carriers are also at increased risk for prostate cancer.  Current information and recommendations set forth by current clinical guidelines are detailed below.      Female breast cancer    The absolute risk of breast cancer in female BRCA2 mutation carriers is >60%, with predisposition to estrogen receptor-positive disease and increased risk for a second, contralateral breast cancer, as well.     Risk management recommendations include the followin. Breast awareness, starting at age 18.  This involves the female's being familiar with their breasts, including performing periodic, consistent breast self-exams (BSEs), and promptly reporting any changes or concerns to their breast healthcare specialist.  Linda states her most recent CBE was a couple months ago (not more than 6 months ago).  2. Clinical breast exams (CBEs) every 6, starting at age 25.  This will be managed by the [facility], to which I'm referring Linda.  3. Annual mammogram and annual breast MRI.  Linda states her most recent mammogram was in 2022 and denies history of breast MRI.  This will be managed by the [facility].  4. Discuss option of risk-reducing mastectomy (RRM).  RRM significantly reduces, but does not eliminate, the chance of developing a new breast cancer.  Linda wishes to meet with a breast surgeon to discuss RRM; I will refer her to [facility].  5. Consideration of chemoprevention for breast cancer risk reduction.  [facility] discusses this with high-risk patients.      Male breast cancer    The absolute risk of breast cancer in male BRCA2 mutation carriers is approximately 7%-8%.      Risk management recommendations include the following:  Breast awareness, clinical breast exams, and sometimes mammograms.  Screening typically begins at age 35.      Ovarian cancer, fallopian tube cancer, and primary peritoneal cancer    The absolute risk of ovarian cancer in female BRCA2 mutation carriers is approximately 13%-29%.     Risk management recommendations include the followin. Bilateral risk-reducing salpingectomy-oophorectomy (RRSO), typically between ages 35 and 40 and upon completion of childbearing, but because ovarian cancer onset is an average of 8-10 years later in BRCA2 mutation carriers than in BRCA1 mutation carriers, it is reasonable to delay RRSO to age 40-45 unless age at diagnosis of ovarian cancer in the family warrants earlier age, and even still the procedure would occur upon completion of childbearing.  Of note, females who undergo hysterectomy with RRSO are candidates for estrogen-alone hormone replacement therapy (HRT), which is associated with less risk of breast cancer as compared to the combined estrogen-and-progesterone HRT that is required if HRT is given when the uterine remains in situ.  Also of note, Linda does not believe she desires further childbearing.  If she has menopausal symptoms post-RRSO, I can refer her to Dr. Alyson Mae.  2. Prior to RRSO, or if the patient has opted out of RRSO, transvaginal ultrasound (TVUS) combined with serum CA-125 can be considered, though of uncertain benefit.    3. Possibility of chemoprevention for ovarian cancer risk reduction.       Prostate cancer    The relative risk of prostate cancer in male BRCA2 mutation carriers is approximately 2- to 6-fold as compared to general population risk in males, or about 33% absolute risk.    Risk management  recommendations include the following:  Annual prostate cancer screening starting at age 40.      Pancreatic cancer    The absolute risk of pancreatic cancer in BRCA2 mutation carriers of both sexes is approximately 5%-10%.     Risk management recommendations include the followin. For BRCA2 mutation carriers with a family history of pancreatic cancer on the same side of the family as the BRCA2 mutation, consider pancreatic cancer screening.  This would start at age 50 or at 10 years younger than the earliest known diagnosis of pancreatic cancer in the family, whichever is younger.  Pancreatic cancer screening should consist of annual contrast-enhanced MRI/magnetic resonance cholangiopancreatography (MRCP) and/or endoscopic ultrasound (EUS).  I will refer Linda to [facility] to establish care, review symptoms for which to monitor, and determine plan for pancreatic cancer risk management.      Uveal melanoma    Uveal melanoma is very rare in the general population (i.e., in mutation non-carriers); in BRCA2 mutation carriers, the relative risk is approximately 99, according to one study, but because of the wide confidence interval, translation to absolute risk is not possible.     Risk management recommendations include the followin. Annual eye examinations.  Patient is established with retina specialist Christiano Vasquez MD, and ophthalmologist Ye Tyson MD.  Advised patient to inform eye doctor of BRCA2 mutation at next eye exam.  2. Minimization of ultraviolet (UV) exposure.        Cutaneous melanoma    An association between BRCA2 mutations and cutaneous melanoma has been described but is not well characterized at this time.     Risk management recommendations include the followin. Annual total-body skin examinations (TBSEs).  Patient is established with dermatologist Andrés Siu MD.  Advised patient to inform dermatologist of BRCA2 mutation.  2. Minimization of  ultraviolet (UV) exposure.        Fanconi anemia    In reproduction, in cases where both partners carry a BRCA2 mutation, there may be a high risk for the offspring to develop Fanconi anemia, an autosomal recessive condition.  Specifically, if each partner carries one BRCA2 mutation, each offspring of theirs has a 25% chance of inheriting both and may develop Fanconi anemia.      This condition is characterized by developmental abnormalities in major organ systems, early-onset bone marrow failure, and a high predisposition to cancer including acute leukemia and solid tumors.  Bone marrow failure with pancytopenia commonly presents in the first decade of life, and the risk of developing any malignancy by age 6 is 97%.    Prior to conceiving, prenatal genetic diagnosis (PGD) with in vitro fertilization (IVF) may be an option for BRCA2 mutation carriers.    Linda's children's father can consider undergoing BRCA2 carrier testing to ensure that his and Linda's children do not need to be tested for biallelic BRCA2 mutations.    Implications for relatives    Each of Linda's children has a 50% chance of also carrying Linda's BRCA2 mutation, while other, more distant relatives are also at risk for carrying the same mutation.  It is highly suspected, though unproven because Linda's mother is untested for the variant, that Linda inherited her BRCA2 mutation from her father.    I will send Linda a letter addressing her relatives.  This letter will include information on the BRCA2 mutation and resources for genetic counseling/testing.    Family Colorectal Cancer Susceptibility Gene Variant     Linda stated that her sister Lexie's hereditary cancer susceptibility gene tested incidentally revealed a colorectal cancer susceptibility gene mutation.  I recommended that Linda find out what gene is mutated in her sister so that we can confirm that that gene was tested and negative in Linda, as  Linda has a 50% chance of having any mutation her sister inherited.    Additional Information    Will plan to see Linda back in 1 year.    Will be sending Linda messages/letter via [facility] in the near future.    Questions were encouraged and were answered to the patient's satisfaction, and she expressed understanding of the information and agreement with the plan.    ASSESSMENT/PLAN      Linda was seen today for newly diagnosed germline brca2 mutation.    Diagnoses and all orders for this visit:    Encounter for nonprocreative genetic counseling        - As above.    Monoallelic mutation of BRCA2 gene  -     Ambulatory referral/consult to Breast Surgery; Future  -     Ambulatory referral/consult to Breast Surgery; Future  -     Ambulatory referral/consult to Gynecologic Oncology; Future  -     Ambulatory referral/consult to Gastroenterology; Future    Follow-up:  Follow up in about 1 year (around 3/23/2023) for BRCA2 follow-up/updates.    Marcin Soto, DNP, APRN, FNP-BC, AOCNP  Nurse Practitioner, Hereditary and High-Risk Clinic  Department of Hematology and Oncology  [facility]    [facility]

## 2022-03-23 NOTE — PROGRESS NOTES
REFERENCES     1. JOHNIE Moya, & ABIOLA Curiel. (Feb 2022). Cancer risks and management of BRCA1/2 carriers without cancer. In ABIOLA Owens, APTRIA Santana, & MIKE Ruiz (Eds.), UpToDate.  2. National Center for Biotechnology Information. ClinVar; [UGG642488727.55], https://www.ncbi.nlm.nih.gov/clinvar/variation/WBL192614166.55 (accessed March 23, 2022).  3. National Comprehensive Cancer Network (NCCN). (2022). Genetic/familial high-risk assessment: Breast, ovarian, and pancreatic. NCCN Clinical Practice Guidelines in Oncology (NCCN Guidelines), Version 2.2022.  4. National Comprehensive Cancer Network (NCCN). (2022). Prostate cancer early detection. NCCN Clinical Practice Guidelines in Oncology (NCCN Guidelines), Version 1.2022.  5. American Cancer Society, Inc. (ACS). (12 January 2021). Key Statistics for Prostate Cancer. Retrieved on November 23, 2021, from https://www.cancer.org/cancer/prostate-cancer/about/key-statistics.html

## 2022-03-23 NOTE — TELEPHONE ENCOUNTER
-Left voicemail to call and schedule with Dr. Mittal referral Marcin Soto---- Message from Marcin Soto, ALEXANDRA sent at 3/23/2022  3:24 PM CDT -----  Devante Flores,    Please schedule Linda for an appointment with Dr. Mittal given her newly diagnosed, germline BRCA2 gene mutation.    Referral is in.    Thank you,  Marcin

## 2022-03-24 ENCOUNTER — TELEPHONE (OUTPATIENT)
Dept: GYNECOLOGIC ONCOLOGY | Facility: CLINIC | Age: 47
End: 2022-03-24
Payer: COMMERCIAL

## 2022-03-24 NOTE — TELEPHONE ENCOUNTER
Spoke with our patient about her insurance schedule appointment in gyn oncology she agreed she voiced understanding of the date, time and location. All questions answered appointment mail. MA/PIA /Preceptor Mark MCKEON

## 2022-03-24 NOTE — TELEPHONE ENCOUNTER
----- Message from Marcin Soto, ALEXANDRA sent at 3/23/2022  3:24 PM CDT -----  Devante Flores,    Please schedule Linda for an appointment with Dr. Mittal given her newly diagnosed, germline BRCA2 gene mutation.    Referral is in.    Thank you,  Marcin

## 2022-04-02 ENCOUNTER — PATIENT MESSAGE (OUTPATIENT)
Dept: HEMATOLOGY/ONCOLOGY | Facility: CLINIC | Age: 47
End: 2022-04-02
Payer: COMMERCIAL

## 2022-05-09 NOTE — PROGRESS NOTES
Subjective:       Patient ID: Linda Acosta is a 47 y.o. female.    Chief Complaint: BRCA 2 gene mutation    Teferred by Marcin Soto. Patient presents to clinic for evaluation of a BRCA 2 mutation. Patient has an appointment with the high risk breast center and Dr. Chisholm in 6/2022.    Patient's father, paternal uncle, and cousin have also tested positive for BRCA 2. She denies family history of ovarian cancer.     Prior abdominal surgeries include c/s x 2.     Primary ob/gyn is Dr. Glenn Marsh.     Review of Systems   Constitutional: Negative for chills and fever.   HENT: Negative for mouth sores.    Respiratory: Negative for chest tightness and shortness of breath.    Cardiovascular: Negative for chest pain.   Gastrointestinal: Negative for abdominal distention, nausea and vomiting.   Genitourinary: Negative for dysuria, hematuria, pelvic pain, vaginal bleeding and vaginal discharge.   Neurological: Negative for syncope and weakness.         Objective:      Physical Exam  Vitals reviewed. Exam conducted with a chaperone present.   Constitutional:       Appearance: Normal appearance.   HENT:      Head: Normocephalic and atraumatic.   Eyes:      Extraocular Movements: Extraocular movements intact.      Conjunctiva/sclera: Conjunctivae normal.      Pupils: Pupils are equal, round, and reactive to light.   Pulmonary:      Effort: Pulmonary effort is normal. No respiratory distress.   Abdominal:      General: There is no distension.      Palpations: Abdomen is soft.      Tenderness: There is no abdominal tenderness.   Musculoskeletal:         General: Normal range of motion.   Skin:     General: Skin is warm and dry.   Neurological:      General: No focal deficit present.      Mental Status: She is alert and oriented to person, place, and time. Mental status is at baseline.   Psychiatric:         Mood and Affect: Mood normal.         Behavior: Behavior normal.         Thought Content: Thought content normal.          Judgment: Judgment normal.         Assessment:       Problem List Items Addressed This Visit        Oncology    Monoallelic mutation of BRCA2 gene    Relevant Orders     (Completed)          Plan:          Patient was counseled today on hereditary breast and ovarian cancer syndrome. The lifetime risk for ovarian cancer is approximately 13-29% percent for BRCA2 mutation carriers. Studies have demonstrated that bilateral salpingo-oophorectomy reduces ovarian cancer risk and mortality by approximately 90 percent. Major recommendations for unaffected female BRCA2 mutation carriers is RRBSO typically between age 40-45, when childbearing is completed and taking into account any family history of ovarian cancer. For mutation carriers who have not undergone RRBSO we recommend ovarian cancer surveillance with concurrent TVUS and  q6 months beginning at age 30 or 5 to 10 years before the earliest age of first diagnosis of ovarian cancer in the family. However, we acknowledged and discussed the limitations of screening for ovarian cancer.      Will obtain pelvic US and  now for baseline. She has several other specialty referrals including breast and GI. Will coordinate prioritization of risk reducing surgery as needed pending baseline testing.       I spent approximately 45 minutes reviewing the available records and evaluating the patient, out of which over 50% of the time was spent face to face with the patient in counseling and coordinating this patient's care.

## 2022-05-10 ENCOUNTER — OFFICE VISIT (OUTPATIENT)
Dept: GYNECOLOGIC ONCOLOGY | Facility: CLINIC | Age: 47
End: 2022-05-10
Payer: COMMERCIAL

## 2022-05-10 ENCOUNTER — TELEPHONE (OUTPATIENT)
Dept: GYNECOLOGIC ONCOLOGY | Facility: CLINIC | Age: 47
End: 2022-05-10
Payer: COMMERCIAL

## 2022-05-10 ENCOUNTER — HOSPITAL ENCOUNTER (OUTPATIENT)
Dept: RADIOLOGY | Facility: HOSPITAL | Age: 47
Discharge: HOME | End: 2022-05-10
Attending: OBSTETRICS & GYNECOLOGY
Payer: COMMERCIAL

## 2022-05-10 VITALS
BODY MASS INDEX: 18.17 KG/M2 | DIASTOLIC BLOOD PRESSURE: 68 MMHG | HEART RATE: 86 BPM | WEIGHT: 123 LBS | SYSTOLIC BLOOD PRESSURE: 108 MMHG

## 2022-05-10 DIAGNOSIS — Z15.09 MONOALLELIC MUTATION OF BRCA2 GENE: ICD-10-CM

## 2022-05-10 DIAGNOSIS — Z15.01 MONOALLELIC MUTATION OF BRCA2 GENE: ICD-10-CM

## 2022-05-10 PROCEDURE — 99999 PR PBB SHADOW E&M-EST. PATIENT-LVL III: CPT | Mod: PBBFAC,,, | Performed by: OBSTETRICS & GYNECOLOGY

## 2022-05-10 PROCEDURE — 76830 TRANSVAGINAL US NON-OB: CPT | Mod: TC

## 2022-05-10 PROCEDURE — 1159F PR MEDICATION LIST DOCUMENTED IN MEDICAL RECORD: ICD-10-PCS | Mod: CPTII,S$GLB,, | Performed by: OBSTETRICS & GYNECOLOGY

## 2022-05-10 PROCEDURE — 3074F PR MOST RECENT SYSTOLIC BLOOD PRESSURE < 130 MM HG: ICD-10-PCS | Mod: CPTII,S$GLB,, | Performed by: OBSTETRICS & GYNECOLOGY

## 2022-05-10 PROCEDURE — 76830 US PELVIS COMP WITH TRANSVAG NON-OB (XPD): ICD-10-PCS | Mod: 26,,, | Performed by: STUDENT IN AN ORGANIZED HEALTH CARE EDUCATION/TRAINING PROGRAM

## 2022-05-10 PROCEDURE — 3008F PR BODY MASS INDEX (BMI) DOCUMENTED: ICD-10-PCS | Mod: CPTII,S$GLB,, | Performed by: OBSTETRICS & GYNECOLOGY

## 2022-05-10 PROCEDURE — 76856 US PELVIS COMP WITH TRANSVAG NON-OB (XPD): ICD-10-PCS | Mod: 26,,, | Performed by: STUDENT IN AN ORGANIZED HEALTH CARE EDUCATION/TRAINING PROGRAM

## 2022-05-10 PROCEDURE — 3074F SYST BP LT 130 MM HG: CPT | Mod: CPTII,S$GLB,, | Performed by: OBSTETRICS & GYNECOLOGY

## 2022-05-10 PROCEDURE — 3008F BODY MASS INDEX DOCD: CPT | Mod: CPTII,S$GLB,, | Performed by: OBSTETRICS & GYNECOLOGY

## 2022-05-10 PROCEDURE — 99999 PR PBB SHADOW E&M-EST. PATIENT-LVL III: ICD-10-PCS | Mod: PBBFAC,,, | Performed by: OBSTETRICS & GYNECOLOGY

## 2022-05-10 PROCEDURE — 76856 US EXAM PELVIC COMPLETE: CPT | Mod: 26,,, | Performed by: STUDENT IN AN ORGANIZED HEALTH CARE EDUCATION/TRAINING PROGRAM

## 2022-05-10 PROCEDURE — 1159F MED LIST DOCD IN RCRD: CPT | Mod: CPTII,S$GLB,, | Performed by: OBSTETRICS & GYNECOLOGY

## 2022-05-10 PROCEDURE — 3078F PR MOST RECENT DIASTOLIC BLOOD PRESSURE < 80 MM HG: ICD-10-PCS | Mod: CPTII,S$GLB,, | Performed by: OBSTETRICS & GYNECOLOGY

## 2022-05-10 PROCEDURE — 3078F DIAST BP <80 MM HG: CPT | Mod: CPTII,S$GLB,, | Performed by: OBSTETRICS & GYNECOLOGY

## 2022-05-10 PROCEDURE — 76830 TRANSVAGINAL US NON-OB: CPT | Mod: 26,,, | Performed by: STUDENT IN AN ORGANIZED HEALTH CARE EDUCATION/TRAINING PROGRAM

## 2022-05-10 PROCEDURE — 99204 OFFICE O/P NEW MOD 45 MIN: CPT | Mod: S$GLB,,, | Performed by: OBSTETRICS & GYNECOLOGY

## 2022-05-10 PROCEDURE — 99204 PR OFFICE/OUTPT VISIT, NEW, LEVL IV, 45-59 MIN: ICD-10-PCS | Mod: S$GLB,,, | Performed by: OBSTETRICS & GYNECOLOGY

## 2022-05-10 NOTE — TELEPHONE ENCOUNTER
Called and reviewed US showing 8.6cm complex right adnexal lesion.  pending. Discussed recommendations for surgery. She is BRCA2. I spoke with Dr. Marsh her primary ob/gyn as well.

## 2022-05-13 ENCOUNTER — PATIENT MESSAGE (OUTPATIENT)
Dept: HEMATOLOGY/ONCOLOGY | Facility: CLINIC | Age: 47
End: 2022-05-13
Payer: COMMERCIAL

## 2022-05-16 ENCOUNTER — PATIENT MESSAGE (OUTPATIENT)
Dept: GYNECOLOGIC ONCOLOGY | Facility: CLINIC | Age: 47
End: 2022-05-16
Payer: COMMERCIAL

## 2022-05-18 ENCOUNTER — PATIENT MESSAGE (OUTPATIENT)
Dept: GYNECOLOGIC ONCOLOGY | Facility: CLINIC | Age: 47
End: 2022-05-18
Payer: COMMERCIAL

## 2022-05-18 ENCOUNTER — TELEPHONE (OUTPATIENT)
Dept: GYNECOLOGIC ONCOLOGY | Facility: CLINIC | Age: 47
End: 2022-05-18
Payer: COMMERCIAL

## 2022-05-18 DIAGNOSIS — Z15.09 MONOALLELIC MUTATION OF BRCA2 GENE: Primary | ICD-10-CM

## 2022-05-18 DIAGNOSIS — Z15.01 MONOALLELIC MUTATION OF BRCA2 GENE: Primary | ICD-10-CM

## 2022-05-18 NOTE — TELEPHONE ENCOUNTER
----- Message from Lashell Mittal MD sent at 5/17/2022  3:59 PM CDT -----  Regarding: RE: surgery dates  Robotic hyst/bso/possible staging   Thank you!  ----- Message -----  From: Emerald Mane MA  Sent: 5/17/2022   3:53 PM CDT  To: Lashell Mittal MD  Subject: FW: surgery dates                                Pt agreed to do Friday 7/1, what  procedure will she be having?  ----- Message -----  From: Lashell Mittal MD  Sent: 5/15/2022   9:27 AM CDT  To: Emerald Mane MA  Subject: surgery dates                                    I spoke with the patient regarding surgery. Wants to wait until her kids get into summer camp.     Please offer her Friday Lory 10 or June 24 at [facility]. Will need pre admit visit and visit with me to sign consents.     Thank you!

## 2022-05-25 PROBLEM — Z91.89 AT HIGH RISK FOR BREAST CANCER: Status: ACTIVE | Noted: 2022-05-25

## 2022-05-31 ENCOUNTER — PATIENT MESSAGE (OUTPATIENT)
Dept: GYNECOLOGIC ONCOLOGY | Facility: CLINIC | Age: 47
End: 2022-05-31
Payer: COMMERCIAL

## 2022-06-08 ENCOUNTER — TELEPHONE (OUTPATIENT)
Dept: GYNECOLOGIC ONCOLOGY | Facility: CLINIC | Age: 47
End: 2022-06-08
Payer: COMMERCIAL

## 2022-06-08 NOTE — TELEPHONE ENCOUNTER
Spoke with our patient about her reschedule appointment in gyn oncology she agreed she voiced understanding of the date, time and location. All questions answered appointment mail. MA/PIA /Preceptor Mark MCKEON

## 2022-06-10 ENCOUNTER — TELEPHONE (OUTPATIENT)
Dept: GYNECOLOGIC ONCOLOGY | Facility: CLINIC | Age: 47
End: 2022-06-10
Payer: COMMERCIAL

## 2022-06-12 ENCOUNTER — PATIENT MESSAGE (OUTPATIENT)
Dept: GYNECOLOGIC ONCOLOGY | Facility: CLINIC | Age: 47
End: 2022-06-12
Payer: COMMERCIAL

## 2022-06-13 ENCOUNTER — TELEPHONE (OUTPATIENT)
Dept: GYNECOLOGIC ONCOLOGY | Facility: CLINIC | Age: 47
End: 2022-06-13
Payer: COMMERCIAL

## 2022-06-13 NOTE — TELEPHONE ENCOUNTER
Returned patient phone call. No answer. Left voicemail. I will also have my staff arrange for a virtual visit.

## 2022-06-22 ENCOUNTER — TELEPHONE (OUTPATIENT)
Dept: GYNECOLOGIC ONCOLOGY | Facility: CLINIC | Age: 47
End: 2022-06-22
Payer: COMMERCIAL

## 2022-06-22 NOTE — TELEPHONE ENCOUNTER
Left voicemail appointment on the 30th will need to be reschedule the provider will be in surgery that date. ----- Message from Emerald Mane MA sent at 6/21/2022  3:03 PM CDT -----  She is scheduled for surgery on 7/1 so you can see if she may want to come before 6/30 but she still needs pre admit and to sign her surgery consents.  ----- Message -----  From: Mark Terry MA  Sent: 6/21/2022   1:34 PM CDT  To: Kyrie Herr    Hi I notice that this patient is still on the schedule on the 30th to sign consent  please let me know if I need to reschedule her she is schedule for the same day with pre admit  as well. Thanks

## 2022-06-22 NOTE — TELEPHONE ENCOUNTER
Attempted to contact pt to help reschedule her appointment 6/30 because Dr. Mittal has surgery. Pt did not answer. LM to call office

## 2022-06-23 ENCOUNTER — TELEPHONE (OUTPATIENT)
Dept: GYNECOLOGIC ONCOLOGY | Facility: CLINIC | Age: 47
End: 2022-06-23
Payer: COMMERCIAL

## 2022-06-24 ENCOUNTER — PATIENT MESSAGE (OUTPATIENT)
Dept: UROGYNECOLOGY | Facility: CLINIC | Age: 47
End: 2022-06-24
Payer: COMMERCIAL

## 2022-06-30 ENCOUNTER — ANESTHESIA EVENT (OUTPATIENT)
Dept: SURGERY | Facility: OTHER | Age: 47
End: 2022-06-30
Payer: COMMERCIAL

## 2022-06-30 ENCOUNTER — HOSPITAL ENCOUNTER (OUTPATIENT)
Dept: PREADMISSION TESTING | Facility: OTHER | Age: 47
Discharge: HOME | End: 2022-06-30
Attending: OBSTETRICS & GYNECOLOGY
Payer: COMMERCIAL

## 2022-06-30 VITALS
HEIGHT: 68 IN | TEMPERATURE: 98 F | WEIGHT: 125 LBS | OXYGEN SATURATION: 99 % | BODY MASS INDEX: 18.94 KG/M2 | HEART RATE: 68 BPM | SYSTOLIC BLOOD PRESSURE: 115 MMHG | RESPIRATION RATE: 16 BRPM | DIASTOLIC BLOOD PRESSURE: 77 MMHG

## 2022-06-30 DIAGNOSIS — Z01.818 PREOP TESTING: Primary | ICD-10-CM

## 2022-06-30 LAB
ABO + RH BLD: NORMAL
ANION GAP SERPL CALC-SCNC: 11 MMOL/L (ref 8–16)
BASOPHILS # BLD AUTO: 0.08 K/UL (ref 0–0.2)
BASOPHILS NFR BLD: 1.5 % (ref 0–1.9)
BLD GP AB SCN CELLS X3 SERPL QL: NORMAL
BUN SERPL-MCNC: 13 MG/DL (ref 6–20)
CALCIUM SERPL-MCNC: 9.5 MG/DL (ref 8.7–10.5)
CHLORIDE SERPL-SCNC: 104 MMOL/L (ref 95–110)
CO2 SERPL-SCNC: 25 MMOL/L (ref 23–29)
CREAT SERPL-MCNC: 0.8 MG/DL (ref 0.5–1.4)
DIFFERENTIAL METHOD: ABNORMAL
EOSINOPHIL # BLD AUTO: 0.1 K/UL (ref 0–0.5)
EOSINOPHIL NFR BLD: 1.5 % (ref 0–8)
ERYTHROCYTE [DISTWIDTH] IN BLOOD BY AUTOMATED COUNT: 11.9 % (ref 11.5–14.5)
EST. GFR  (AFRICAN AMERICAN): >60 ML/MIN/1.73 M^2
EST. GFR  (NON AFRICAN AMERICAN): >60 ML/MIN/1.73 M^2
GLUCOSE SERPL-MCNC: 81 MG/DL (ref 70–110)
HCT VFR BLD AUTO: 47.9 % (ref 37–48.5)
HGB BLD-MCNC: 16.6 G/DL (ref 12–16)
IMM GRANULOCYTES # BLD AUTO: 0.01 K/UL (ref 0–0.04)
IMM GRANULOCYTES NFR BLD AUTO: 0.2 % (ref 0–0.5)
LYMPHOCYTES # BLD AUTO: 1.4 K/UL (ref 1–4.8)
LYMPHOCYTES NFR BLD: 26.8 % (ref 18–48)
MCH RBC QN AUTO: 31.4 PG (ref 27–31)
MCHC RBC AUTO-ENTMCNC: 34.7 G/DL (ref 32–36)
MCV RBC AUTO: 91 FL (ref 82–98)
MONOCYTES # BLD AUTO: 0.4 K/UL (ref 0.3–1)
MONOCYTES NFR BLD: 7.8 % (ref 4–15)
NEUTROPHILS # BLD AUTO: 3.3 K/UL (ref 1.8–7.7)
NEUTROPHILS NFR BLD: 62.2 % (ref 38–73)
NRBC BLD-RTO: 0 /100 WBC
PLATELET # BLD AUTO: 251 K/UL (ref 150–450)
PMV BLD AUTO: 9 FL (ref 9.2–12.9)
POTASSIUM SERPL-SCNC: 4.4 MMOL/L (ref 3.5–5.1)
RBC # BLD AUTO: 5.29 M/UL (ref 4–5.4)
RH BLD: NORMAL
SODIUM SERPL-SCNC: 140 MMOL/L (ref 136–145)
WBC # BLD AUTO: 5.23 K/UL (ref 3.9–12.7)

## 2022-06-30 PROCEDURE — 86901 BLOOD TYPING SEROLOGIC RH(D): CPT | Performed by: ANESTHESIOLOGY

## 2022-06-30 PROCEDURE — 85025 COMPLETE CBC W/AUTO DIFF WBC: CPT | Performed by: ANESTHESIOLOGY

## 2022-06-30 PROCEDURE — 86850 RBC ANTIBODY SCREEN: CPT | Performed by: ANESTHESIOLOGY

## 2022-06-30 PROCEDURE — 80048 BASIC METABOLIC PNL TOTAL CA: CPT | Performed by: ANESTHESIOLOGY

## 2022-06-30 PROCEDURE — 36415 COLL VENOUS BLD VENIPUNCTURE: CPT | Performed by: ANESTHESIOLOGY

## 2022-06-30 RX ORDER — ACETAMINOPHEN 500 MG
1000 TABLET ORAL
Status: CANCELLED | OUTPATIENT
Start: 2022-06-30 | End: 2022-06-30

## 2022-06-30 RX ORDER — ALBUTEROL SULFATE 2.5 MG/.5ML
2.5 SOLUTION RESPIRATORY (INHALATION)
Status: CANCELLED | OUTPATIENT
Start: 2022-06-30 | End: 2022-06-30

## 2022-06-30 RX ORDER — LIDOCAINE HYDROCHLORIDE 10 MG/ML
0.5 INJECTION, SOLUTION EPIDURAL; INFILTRATION; INTRACAUDAL; PERINEURAL ONCE
Status: CANCELLED | OUTPATIENT
Start: 2022-06-30 | End: 2022-06-30

## 2022-06-30 RX ORDER — SODIUM CHLORIDE, SODIUM LACTATE, POTASSIUM CHLORIDE, CALCIUM CHLORIDE 600; 310; 30; 20 MG/100ML; MG/100ML; MG/100ML; MG/100ML
INJECTION, SOLUTION INTRAVENOUS CONTINUOUS
Status: CANCELLED | OUTPATIENT
Start: 2022-06-30

## 2022-06-30 RX ORDER — IBUPROFEN 200 MG
400 TABLET ORAL EVERY 6 HOURS PRN
COMMUNITY
End: 2022-11-03

## 2022-06-30 NOTE — ANESTHESIA PREPROCEDURE EVALUATION
06/30/2022  Linda Acosta is a 47 y.o., female.      Pre-op Assessment    I have reviewed the Patient Summary Reports.     I have reviewed the Nursing Notes. I have reviewed the NPO Status.   I have reviewed the Medications.     Review of Systems  Anesthesia Hx:  Denies Family Hx of Anesthesia complications.   Denies Personal Hx of Anesthesia complications.   Social:  Non-Smoker    Hematology/Oncology:  Hematology Normal   Oncology Normal     Cardiovascular:  Cardiovascular Normal Exercise tolerance: good     Pulmonary:   Asthma mild    Renal/:  Renal/ Normal     Hepatic/GI:   GERD IBS   Neurological:  Neurology Normal    Endocrine:  Endocrine Normal    Psych:   Psychiatric History (ADD)          Physical Exam  General: Cooperative, Alert and Oriented    Airway:  Mallampati: II   Mouth Opening: Normal  TM Distance: Normal  Tongue: Normal  Neck ROM: Normal ROM    Dental:  Intact, Retainer        Anesthesia Plan  Type of Anesthesia, risks & benefits discussed:    Anesthesia Type: Gen ETT  Intra-op Monitoring Plan: Standard ASA Monitors  Post Op Pain Control Plan: multimodal analgesia  Induction:  IV  Airway Plan: , Post-Induction  Informed Consent: Informed consent signed with the Patient and all parties understand the risks and agree with anesthesia plan.  All questions answered.   ASA Score: 2  Anesthesia Plan Notes: CBC, BMP, T&S    Ready For Surgery From Anesthesia Perspective.     .

## 2022-06-30 NOTE — DISCHARGE INSTRUCTIONS
Information to Prepare you for your Surgery    PRE-ADMIT TESTING -  [phone]    [address] [facility]          Your surgery has been scheduled at [facility]. We are pleased to have the opportunity to serve you. For Further Information please call [phone].    On the day of surgery please report to the Information Desk on the 1st floor.    CONTACT YOUR PHYSICIAN'S OFFICE THE DAY PRIOR TO YOUR SURGERY TO OBTAIN YOUR ARRIVAL TIME.     The evening before surgery do not eat anything after 9 p.m. ( this includes hard candy, chewing gum and mints).  You may only have GATORADE, POWERADE AND WATER  from 9 p.m. until you leave your home.   DO NOT DRINK ANY LIQUIDS ON THE WAY TO THE HOSPITAL.      Why does your anesthesiologist allow you to drink Gatorade/Powerade before surgery?  Gatorade/Powerade helps to increase your comfort before surgery and to decrease your nausea after surgery. The carbohydrates in Gatorade/Powerade help reduce your body's stress response to surgery.  If you are a diabetic-drink only water prior to surgery.      Current Visitor policy(12/27/2021) - Patients may have 2 visitors pre and post procedure. Only 2 visitors will be allowed in the Surgical building with the patient.     SPECIAL MEDICATION INSTRUCTIONS: TAKE medications checked off by the Anesthesiologist on your Medication List.    Angiogram Patients: Take medications as instructed by your physician, including aspirin.     Surgery Patients:    If you take ASPIRIN - Your PHYSICIAN/SURGEON will need to inform you IF/OR when you need to stop taking aspirin prior to your surgery.     Do Not take any medications containing IBUPROFEN.    Do Not Wear any make-up (especially eye make-up) to surgery. Please remove any false eyelashes or eyelash extensions. If you arrive the day of surgery with makeup/eyelashes on you will be required to remove prior to surgery. (There is a risk of corneal  abrasions if eye makeup/eyelash extensions are not removed)      Leave all valuables at home.   Do Not wear any jewelry or watches, including any metal in body piercings. Jewelry must be removed prior to coming to the hospital.  There is a possibility that rings that are unable to be removed may be cut off if they are on the surgical extremity.    Please remove all hair extensions, wigs, clips and any other metal accessories/ ornaments from your hair.  These items may pose a flammable/fire risk in Surgery and must be removed.    Do not shave your surgical area at least 5 days prior to your surgery. The surgical prep will be performed at the hospital according to Infection Control regulations.    Contact Lens must be removed before surgery. Either do not wear the contact lens or bring a case and solution for storage.  Please bring a container for eyeglasses or dentures as required.  Bring any paperwork your physician has provided, such as consent forms,  history and physicals, doctor's orders, etc.   Bring comfortable clothes that are loose fitting to wear upon discharge. Take into consideration the type of surgery being performed.  Maintain your diet as advised per your physician the day prior to surgery.      Adequate rest the night before surgery is advised.   Park in the Parking lot behind the hospital or in the [employer] [facility] across the street from the parking lot. Parking is complimentary.  If you will be discharged the same day as your procedure, please arrange for a responsible adult to drive you home or to accompany you if traveling by taxi.   YOU WILL NOT BE PERMITTED TO DRIVE OR TO LEAVE THE HOSPITAL ALONE AFTER SURGERY.   If you are being discharged the same day, it is strongly recommended that you arrange for someone to remain with you for the first 24 hrs following your surgery.    The Surgeon will speak to your family/visitor after your surgery regarding the outcome of your surgery and post op  care.  The Surgeon may speak to you after your surgery, but there is a possibility you may not remember the details.  Please check with your family members regarding the conversation with the Surgeon.    We strongly recommend whoever is bringing you home be present for discharge instructions.  This will ensure a thorough understanding for your post op home care.    ALL CHILDREN MUST ALWAYS BE ACCOMPANIED BY AN ADULT.    Visitors-Refer to current Visitor policy handouts.    Thank you for your cooperation.  The Staff of [facility].            Bathing Instructions with Hibiclens    Shower the evening before and morning of your procedure with Hibiclens:  Wash your face with water and your regular face wash/soap  Apply Hibiclens directly on your skin or on a wet washcloth and wash gently. When showering: Move away from the shower stream when applying Hibiclens to avoid rinsing off too soon.  Rinse thoroughly with warm water  Do not dilute Hibiclens        Dry off as usual, do not use any deodorant, powder, body lotions, perfume, after shave or cologne.

## 2022-07-01 ENCOUNTER — HOSPITAL ENCOUNTER (OUTPATIENT)
Facility: OTHER | Age: 47
Discharge: HOME | End: 2022-07-01
Attending: OBSTETRICS & GYNECOLOGY | Admitting: OBSTETRICS & GYNECOLOGY
Payer: COMMERCIAL

## 2022-07-01 ENCOUNTER — ANESTHESIA (OUTPATIENT)
Dept: SURGERY | Facility: OTHER | Age: 47
End: 2022-07-01
Payer: COMMERCIAL

## 2022-07-01 DIAGNOSIS — Z15.09 BRCA GENE MUTATION POSITIVE IN FEMALE: Primary | ICD-10-CM

## 2022-07-01 DIAGNOSIS — Z15.01 BRCA GENE MUTATION POSITIVE IN FEMALE: Primary | ICD-10-CM

## 2022-07-01 DIAGNOSIS — Z15.02 BRCA GENE MUTATION POSITIVE IN FEMALE: Primary | ICD-10-CM

## 2022-07-01 LAB
B-HCG UR QL: NEGATIVE
CTP QC/QA: YES
POCT GLUCOSE: 86 MG/DL (ref 70–110)

## 2022-07-01 PROCEDURE — 58571 PR LAPAROSCOPY W TOT HYSTERECTUTERUS <=250 GRAM  W TUBE/OVARY: ICD-10-PCS | Mod: AS,,, | Performed by: PHYSICIAN ASSISTANT

## 2022-07-01 PROCEDURE — 71000016 HC POSTOP RECOV ADDL HR: Performed by: OBSTETRICS & GYNECOLOGY

## 2022-07-01 PROCEDURE — 88309 TISSUE EXAM BY PATHOLOGIST: CPT | Performed by: PATHOLOGY

## 2022-07-01 PROCEDURE — 71000033 HC RECOVERY, INTIAL HOUR: Performed by: OBSTETRICS & GYNECOLOGY

## 2022-07-01 PROCEDURE — 58571 TLH W/T/O 250 G OR LESS: CPT | Mod: AS,,, | Performed by: PHYSICIAN ASSISTANT

## 2022-07-01 PROCEDURE — 37000008 HC ANESTHESIA 1ST 15 MINUTES: Performed by: OBSTETRICS & GYNECOLOGY

## 2022-07-01 PROCEDURE — 51798 US URINE CAPACITY MEASURE: CPT | Performed by: OBSTETRICS & GYNECOLOGY

## 2022-07-01 PROCEDURE — 71000039 HC RECOVERY, EACH ADD'L HOUR: Performed by: OBSTETRICS & GYNECOLOGY

## 2022-07-01 PROCEDURE — 36000712 HC OR TIME LEV V 1ST 15 MIN: Performed by: OBSTETRICS & GYNECOLOGY

## 2022-07-01 PROCEDURE — 88331 PATH CONSLTJ SURG 1 BLK 1SPC: CPT | Performed by: PATHOLOGY

## 2022-07-01 PROCEDURE — 63600175 PHARM REV CODE 636 W HCPCS: Performed by: ANESTHESIOLOGY

## 2022-07-01 PROCEDURE — 88331 PR  PATH CONSULT IN SURG,W FRZ SEC: ICD-10-PCS | Mod: 26,,, | Performed by: PATHOLOGY

## 2022-07-01 PROCEDURE — 36000713 HC OR TIME LEV V EA ADD 15 MIN: Performed by: OBSTETRICS & GYNECOLOGY

## 2022-07-01 PROCEDURE — 81025 URINE PREGNANCY TEST: CPT | Performed by: ANESTHESIOLOGY

## 2022-07-01 PROCEDURE — 82962 GLUCOSE BLOOD TEST: CPT | Performed by: OBSTETRICS & GYNECOLOGY

## 2022-07-01 PROCEDURE — 88305 TISSUE EXAM BY PATHOLOGIST: CPT | Mod: 59 | Performed by: PATHOLOGY

## 2022-07-01 PROCEDURE — 88307 PR  SURG PATH,LEVEL V: ICD-10-PCS | Mod: 26,,, | Performed by: PATHOLOGY

## 2022-07-01 PROCEDURE — 88305 TISSUE EXAM BY PATHOLOGIST: CPT | Performed by: PATHOLOGY

## 2022-07-01 PROCEDURE — 25000003 PHARM REV CODE 250: Performed by: NURSE ANESTHETIST, CERTIFIED REGISTERED

## 2022-07-01 PROCEDURE — 25000242 PHARM REV CODE 250 ALT 637 W/ HCPCS: Performed by: ANESTHESIOLOGY

## 2022-07-01 PROCEDURE — 25000003 PHARM REV CODE 250: Performed by: ANESTHESIOLOGY

## 2022-07-01 PROCEDURE — 88305 TISSUE EXAM BY PATHOLOGIST: ICD-10-PCS | Mod: 26,,, | Performed by: PATHOLOGY

## 2022-07-01 PROCEDURE — 63600175 PHARM REV CODE 636 W HCPCS: Performed by: NURSE ANESTHETIST, CERTIFIED REGISTERED

## 2022-07-01 PROCEDURE — 58571 TLH W/T/O 250 G OR LESS: CPT | Mod: ,,, | Performed by: OBSTETRICS & GYNECOLOGY

## 2022-07-01 PROCEDURE — 58571 PR LAPAROSCOPY W TOT HYSTERECTUTERUS <=250 GRAM  W TUBE/OVARY: ICD-10-PCS | Mod: ,,, | Performed by: OBSTETRICS & GYNECOLOGY

## 2022-07-01 PROCEDURE — 25000003 PHARM REV CODE 250: Performed by: STUDENT IN AN ORGANIZED HEALTH CARE EDUCATION/TRAINING PROGRAM

## 2022-07-01 PROCEDURE — 88305 TISSUE EXAM BY PATHOLOGIST: CPT | Mod: 26,,, | Performed by: PATHOLOGY

## 2022-07-01 PROCEDURE — 88331 PATH CONSLTJ SURG 1 BLK 1SPC: CPT | Mod: 26,,, | Performed by: PATHOLOGY

## 2022-07-01 PROCEDURE — 94640 AIRWAY INHALATION TREATMENT: CPT

## 2022-07-01 PROCEDURE — 63600175 PHARM REV CODE 636 W HCPCS: Performed by: STUDENT IN AN ORGANIZED HEALTH CARE EDUCATION/TRAINING PROGRAM

## 2022-07-01 PROCEDURE — 88112 PR  CYTOPATH, CELL ENHANCE TECH: ICD-10-PCS | Mod: 26,,, | Performed by: PATHOLOGY

## 2022-07-01 PROCEDURE — 27201423 OPTIME MED/SURG SUP & DEVICES STERILE SUPPLY: Performed by: OBSTETRICS & GYNECOLOGY

## 2022-07-01 PROCEDURE — 88112 CYTOPATH CELL ENHANCE TECH: CPT | Performed by: PATHOLOGY

## 2022-07-01 PROCEDURE — 37000009 HC ANESTHESIA EA ADD 15 MINS: Performed by: OBSTETRICS & GYNECOLOGY

## 2022-07-01 PROCEDURE — 88307 TISSUE EXAM BY PATHOLOGIST: CPT | Mod: 26,,, | Performed by: PATHOLOGY

## 2022-07-01 PROCEDURE — 88112 CYTOPATH CELL ENHANCE TECH: CPT | Mod: 26,,, | Performed by: PATHOLOGY

## 2022-07-01 PROCEDURE — 71000015 HC POSTOP RECOV 1ST HR: Performed by: OBSTETRICS & GYNECOLOGY

## 2022-07-01 RX ORDER — OXYCODONE HYDROCHLORIDE 5 MG/1
5 TABLET ORAL
Status: DISCONTINUED | OUTPATIENT
Start: 2022-07-01 | End: 2022-07-02 | Stop reason: HOSPADM

## 2022-07-01 RX ORDER — CEFAZOLIN SODIUM 2 G/50ML
2 SOLUTION INTRAVENOUS
Status: COMPLETED | OUTPATIENT
Start: 2022-07-01 | End: 2022-07-01

## 2022-07-01 RX ORDER — PROPOFOL 10 MG/ML
VIAL (ML) INTRAVENOUS
Status: DISCONTINUED | OUTPATIENT
Start: 2022-07-01 | End: 2022-07-01

## 2022-07-01 RX ORDER — PROCHLORPERAZINE EDISYLATE 5 MG/ML
5 INJECTION INTRAMUSCULAR; INTRAVENOUS EVERY 30 MIN PRN
Status: CANCELLED | OUTPATIENT
Start: 2022-07-01

## 2022-07-01 RX ORDER — MIDAZOLAM HYDROCHLORIDE 1 MG/ML
INJECTION INTRAMUSCULAR; INTRAVENOUS
Status: DISCONTINUED | OUTPATIENT
Start: 2022-07-01 | End: 2022-07-01

## 2022-07-01 RX ORDER — KETOROLAC TROMETHAMINE 30 MG/ML
INJECTION, SOLUTION INTRAMUSCULAR; INTRAVENOUS
Status: DISCONTINUED | OUTPATIENT
Start: 2022-07-01 | End: 2022-07-01

## 2022-07-01 RX ORDER — DIPHENHYDRAMINE HYDROCHLORIDE 50 MG/ML
25 INJECTION INTRAMUSCULAR; INTRAVENOUS EVERY 6 HOURS PRN
Status: CANCELLED | OUTPATIENT
Start: 2022-07-01

## 2022-07-01 RX ORDER — ALBUTEROL SULFATE 2.5 MG/.5ML
2.5 SOLUTION RESPIRATORY (INHALATION)
Status: COMPLETED | OUTPATIENT
Start: 2022-07-01 | End: 2022-07-01

## 2022-07-01 RX ORDER — SODIUM CHLORIDE 9 MG/ML
INJECTION, SOLUTION INTRAVENOUS CONTINUOUS
Status: DISCONTINUED | OUTPATIENT
Start: 2022-07-01 | End: 2022-07-02 | Stop reason: HOSPADM

## 2022-07-01 RX ORDER — PROCHLORPERAZINE EDISYLATE 5 MG/ML
5 INJECTION INTRAMUSCULAR; INTRAVENOUS EVERY 30 MIN PRN
Status: DISCONTINUED | OUTPATIENT
Start: 2022-07-01 | End: 2022-07-02 | Stop reason: HOSPADM

## 2022-07-01 RX ORDER — DIPHENHYDRAMINE HYDROCHLORIDE 50 MG/ML
INJECTION INTRAMUSCULAR; INTRAVENOUS
Status: DISCONTINUED | OUTPATIENT
Start: 2022-07-01 | End: 2022-07-01

## 2022-07-01 RX ORDER — LIDOCAINE HYDROCHLORIDE 10 MG/ML
0.5 INJECTION, SOLUTION EPIDURAL; INFILTRATION; INTRACAUDAL; PERINEURAL ONCE
Status: DISCONTINUED | OUTPATIENT
Start: 2022-07-01 | End: 2022-07-02 | Stop reason: HOSPADM

## 2022-07-01 RX ORDER — MEPERIDINE HYDROCHLORIDE 25 MG/ML
12.5 INJECTION INTRAMUSCULAR; INTRAVENOUS; SUBCUTANEOUS ONCE AS NEEDED
Status: DISCONTINUED | OUTPATIENT
Start: 2022-07-01 | End: 2022-07-02 | Stop reason: HOSPADM

## 2022-07-01 RX ORDER — IBUPROFEN 600 MG/1
600 TABLET ORAL EVERY 6 HOURS PRN
Qty: 60 TABLET | Refills: 1 | Status: SHIPPED | OUTPATIENT
Start: 2022-07-01 | End: 2022-11-03

## 2022-07-01 RX ORDER — SODIUM CHLORIDE 0.9 % (FLUSH) 0.9 %
3 SYRINGE (ML) INJECTION
Status: CANCELLED | OUTPATIENT
Start: 2022-07-01

## 2022-07-01 RX ORDER — ACETAMINOPHEN 500 MG
1000 TABLET ORAL
Status: COMPLETED | OUTPATIENT
Start: 2022-07-01 | End: 2022-07-01

## 2022-07-01 RX ORDER — DEXTROMETHORPHAN HYDROBROMIDE, GUAIFENESIN 5; 100 MG/5ML; MG/5ML
650 LIQUID ORAL
Qty: 60 TABLET | Refills: 1 | Status: SHIPPED | OUTPATIENT
Start: 2022-07-01 | End: 2022-11-03

## 2022-07-01 RX ORDER — OXYCODONE HYDROCHLORIDE 5 MG/1
5 TABLET ORAL EVERY 4 HOURS PRN
Qty: 15 TABLET | Refills: 0 | Status: SHIPPED | OUTPATIENT
Start: 2022-07-01 | End: 2022-11-03

## 2022-07-01 RX ORDER — DEXAMETHASONE SODIUM PHOSPHATE 4 MG/ML
INJECTION, SOLUTION INTRA-ARTICULAR; INTRALESIONAL; INTRAMUSCULAR; INTRAVENOUS; SOFT TISSUE
Status: DISCONTINUED | OUTPATIENT
Start: 2022-07-01 | End: 2022-07-01

## 2022-07-01 RX ORDER — OXYCODONE HYDROCHLORIDE 5 MG/1
5 TABLET ORAL
Status: CANCELLED | OUTPATIENT
Start: 2022-07-01

## 2022-07-01 RX ORDER — MEPERIDINE HYDROCHLORIDE 25 MG/ML
12.5 INJECTION INTRAMUSCULAR; INTRAVENOUS; SUBCUTANEOUS ONCE AS NEEDED
Status: CANCELLED | OUTPATIENT
Start: 2022-07-01 | End: 2022-07-02

## 2022-07-01 RX ORDER — OXYCODONE HYDROCHLORIDE 5 MG/1
5 TABLET ORAL ONCE
Status: COMPLETED | OUTPATIENT
Start: 2022-07-01 | End: 2022-07-01

## 2022-07-01 RX ORDER — SODIUM CHLORIDE 0.9 % (FLUSH) 0.9 %
3 SYRINGE (ML) INJECTION
Status: DISCONTINUED | OUTPATIENT
Start: 2022-07-01 | End: 2022-07-02 | Stop reason: HOSPADM

## 2022-07-01 RX ORDER — HYDROMORPHONE HYDROCHLORIDE 2 MG/ML
0.4 INJECTION, SOLUTION INTRAMUSCULAR; INTRAVENOUS; SUBCUTANEOUS EVERY 5 MIN PRN
Status: CANCELLED | OUTPATIENT
Start: 2022-07-01

## 2022-07-01 RX ORDER — MIDAZOLAM HYDROCHLORIDE 1 MG/ML
2 INJECTION INTRAMUSCULAR; INTRAVENOUS ONCE
Status: COMPLETED | OUTPATIENT
Start: 2022-07-01 | End: 2022-07-01

## 2022-07-01 RX ORDER — FENTANYL CITRATE 50 UG/ML
INJECTION, SOLUTION INTRAMUSCULAR; INTRAVENOUS
Status: DISCONTINUED | OUTPATIENT
Start: 2022-07-01 | End: 2022-07-01

## 2022-07-01 RX ORDER — MUPIROCIN 20 MG/G
OINTMENT TOPICAL
Status: DISCONTINUED | OUTPATIENT
Start: 2022-07-01 | End: 2022-07-02 | Stop reason: HOSPADM

## 2022-07-01 RX ORDER — ONDANSETRON HYDROCHLORIDE 2 MG/ML
INJECTION, SOLUTION INTRAMUSCULAR; INTRAVENOUS
Status: DISCONTINUED | OUTPATIENT
Start: 2022-07-01 | End: 2022-07-01

## 2022-07-01 RX ORDER — HYDROMORPHONE HYDROCHLORIDE 2 MG/ML
0.4 INJECTION, SOLUTION INTRAMUSCULAR; INTRAVENOUS; SUBCUTANEOUS EVERY 5 MIN PRN
Status: DISCONTINUED | OUTPATIENT
Start: 2022-07-01 | End: 2022-07-02 | Stop reason: HOSPADM

## 2022-07-01 RX ORDER — LIDOCAINE HCL/PF 100 MG/5ML
SYRINGE (ML) INTRAVENOUS
Status: DISCONTINUED | OUTPATIENT
Start: 2022-07-01 | End: 2022-07-01

## 2022-07-01 RX ORDER — ROCURONIUM BROMIDE 10 MG/ML
INJECTION, SOLUTION INTRAVENOUS
Status: DISCONTINUED | OUTPATIENT
Start: 2022-07-01 | End: 2022-07-01

## 2022-07-01 RX ORDER — SODIUM CHLORIDE, SODIUM LACTATE, POTASSIUM CHLORIDE, CALCIUM CHLORIDE 600; 310; 30; 20 MG/100ML; MG/100ML; MG/100ML; MG/100ML
INJECTION, SOLUTION INTRAVENOUS CONTINUOUS
Status: DISCONTINUED | OUTPATIENT
Start: 2022-07-01 | End: 2022-07-02 | Stop reason: HOSPADM

## 2022-07-01 RX ADMIN — FENTANYL CITRATE 50 MCG: 50 INJECTION, SOLUTION INTRAMUSCULAR; INTRAVENOUS at 02:07

## 2022-07-01 RX ADMIN — HYDROMORPHONE HYDROCHLORIDE 0.4 MG: 2 INJECTION INTRAMUSCULAR; INTRAVENOUS; SUBCUTANEOUS at 04:07

## 2022-07-01 RX ADMIN — CARBOXYMETHYLCELLULOSE SODIUM 2 DROP: 2.5 SOLUTION/ DROPS OPHTHALMIC at 01:07

## 2022-07-01 RX ADMIN — SODIUM CHLORIDE, SODIUM LACTATE, POTASSIUM CHLORIDE, AND CALCIUM CHLORIDE: 600; 310; 30; 20 INJECTION, SOLUTION INTRAVENOUS at 11:07

## 2022-07-01 RX ADMIN — ACETAMINOPHEN 1000 MG: 500 TABLET, FILM COATED ORAL at 10:07

## 2022-07-01 RX ADMIN — LIDOCAINE HYDROCHLORIDE 50 MG: 20 INJECTION, SOLUTION INTRAVENOUS at 01:07

## 2022-07-01 RX ADMIN — PROCHLORPERAZINE EDISYLATE 5 MG: 5 INJECTION INTRAMUSCULAR; INTRAVENOUS at 06:07

## 2022-07-01 RX ADMIN — FENTANYL CITRATE 100 MCG: 50 INJECTION, SOLUTION INTRAMUSCULAR; INTRAVENOUS at 01:07

## 2022-07-01 RX ADMIN — KETOROLAC TROMETHAMINE 30 MG: 30 INJECTION, SOLUTION INTRAMUSCULAR; INTRAVENOUS at 03:07

## 2022-07-01 RX ADMIN — SODIUM CHLORIDE, SODIUM LACTATE, POTASSIUM CHLORIDE, AND CALCIUM CHLORIDE: 600; 310; 30; 20 INJECTION, SOLUTION INTRAVENOUS at 03:07

## 2022-07-01 RX ADMIN — PROPOFOL 170 MG: 10 INJECTION, EMULSION INTRAVENOUS at 01:07

## 2022-07-01 RX ADMIN — GLYCOPYRROLATE 0.3 MG: 0.2 INJECTION, SOLUTION INTRAMUSCULAR; INTRAVITREAL at 01:07

## 2022-07-01 RX ADMIN — OXYCODONE HYDROCHLORIDE 5 MG: 5 TABLET ORAL at 04:07

## 2022-07-01 RX ADMIN — OXYCODONE 5 MG: 5 TABLET ORAL at 06:07

## 2022-07-01 RX ADMIN — MUPIROCIN: 20 OINTMENT TOPICAL at 10:07

## 2022-07-01 RX ADMIN — DIPHENHYDRAMINE HYDROCHLORIDE 12.5 MG: 50 INJECTION, SOLUTION INTRAMUSCULAR; INTRAVENOUS at 03:07

## 2022-07-01 RX ADMIN — ALBUTEROL SULFATE 2.5 MG: 2.5 SOLUTION RESPIRATORY (INHALATION) at 10:07

## 2022-07-01 RX ADMIN — MIDAZOLAM HYDROCHLORIDE 2 MG: 1 INJECTION, SOLUTION INTRAMUSCULAR; INTRAVENOUS at 12:07

## 2022-07-01 RX ADMIN — CEFAZOLIN SODIUM 2 G: 2 SOLUTION INTRAVENOUS at 01:07

## 2022-07-01 RX ADMIN — MIDAZOLAM HYDROCHLORIDE 1 MG: 1 INJECTION, SOLUTION INTRAMUSCULAR; INTRAVENOUS at 04:07

## 2022-07-01 RX ADMIN — FENTANYL CITRATE 50 MCG: 50 INJECTION, SOLUTION INTRAMUSCULAR; INTRAVENOUS at 03:07

## 2022-07-01 RX ADMIN — DEXAMETHASONE SODIUM PHOSPHATE 8 MG: 4 INJECTION, SOLUTION INTRAMUSCULAR; INTRAVENOUS at 01:07

## 2022-07-01 RX ADMIN — PROPOFOL 30 MG: 10 INJECTION, EMULSION INTRAVENOUS at 02:07

## 2022-07-01 RX ADMIN — ROCURONIUM BROMIDE 50 MG: 10 INJECTION, SOLUTION INTRAVENOUS at 01:07

## 2022-07-01 RX ADMIN — SODIUM CHLORIDE, SODIUM LACTATE, POTASSIUM CHLORIDE, AND CALCIUM CHLORIDE: 600; 310; 30; 20 INJECTION, SOLUTION INTRAVENOUS at 01:07

## 2022-07-01 RX ADMIN — ONDANSETRON 4 MG: 2 INJECTION, SOLUTION INTRAMUSCULAR; INTRAVENOUS at 03:07

## 2022-07-01 NOTE — TRANSFER OF CARE
"Anesthesia Transfer of Care Note    Patient: Linda Arevalo Dave    Procedure(s) Performed: Procedure(s) (LRB):  XI ROBOTIC HYSTERECTOMY (N/A)  XI ROBOTIC SALPINGO-OOPHORECTOMY (Bilateral)    Patient location: PACU    Anesthesia Type: general    Transport from OR: Transported from OR on 2-3 L/min O2 by NC with adequate spontaneous ventilation    Post pain: adequate analgesia    Post assessment: no apparent anesthetic complications    Post vital signs: stable    Level of consciousness: awake    Nausea/Vomiting: no nausea/vomiting    Complications: none    Transfer of care protocol was followedComments: See q note      Last vitals:   Visit Vitals  /77 (BP Location: Right arm, Patient Position: Lying)   Pulse 68   Temp 36.9 °C (98.4 °F) (Oral)   Resp 18   Ht 5' 8" (1.727 m)   Wt 56.7 kg (125 lb)   LMP 06/20/2022 (Exact Date)   SpO2 99%   Breastfeeding No   BMI 19.01 kg/m²     "

## 2022-07-01 NOTE — OPERATIVE NOTE ADDENDUM
Certification of Assistant at Surgery       Surgery Date: 7/1/2022     Participating Surgeons:  Surgeon(s) and Role:     * Lashell Mittal MD - Primary    Procedures:  Procedure(s) (LRB):  XI ROBOTIC HYSTERECTOMY (N/A)  XI ROBOTIC SALPINGO-OOPHORECTOMY (Bilateral)    Assistant Surgeon's Certification of Necessity:  I understand that section 1842 (b) (6) (d) of the Social Security Act generally prohibits Medicare Part B reasonable charge payment for the services of assistants at surgery in teaching hospitals when qualified residents are available to furnish such services. I certify that the services for which payment is claimed were medically necessary, and that no qualified resident was available to perform the services. I further understand that these services are subject to post-payment review by the Medicare carrier.      OLEKSANDR Cabral    07/01/2022  4:11 PM

## 2022-07-01 NOTE — H&P
H&P completed on 5/10/22 has been reviewed, the patient has been examined and:  I concur with the findings and no changes have occurred since H&P was written.    There are no hospital problems to display for this patient.         Subjective:       Patient ID: Linda Acosta is a 47 y.o. female.     Chief Complaint: BRCA 2 gene mutation     Teferred by Marcin Soto. Patient presents to clinic for evaluation of a BRCA 2 mutation. Patient has an appointment with the [facility] breast center and Dr. Chisholm in 6/2022.     Patient's father, paternal uncle, and cousin have also tested positive for BRCA 2. She denies family history of ovarian cancer.      Prior abdominal surgeries include c/s x 2.      Primary ob/gyn is Dr. Glenn Marsh.      Review of Systems   Constitutional: Negative for chills and fever.   HENT: Negative for mouth sores.    Respiratory: Negative for chest tightness and shortness of breath.    Cardiovascular: Negative for chest pain.   Gastrointestinal: Negative for abdominal distention, nausea and vomiting.   Genitourinary: Negative for dysuria, hematuria, pelvic pain, vaginal bleeding and vaginal discharge.   Neurological: Negative for syncope and weakness.          Objective:   Physical Exam  Vitals reviewed. Exam conducted with a chaperone present.   Constitutional:       Appearance: Normal appearance.   HENT:      Head: Normocephalic and atraumatic.   Eyes:      Extraocular Movements: Extraocular movements intact.      Conjunctiva/sclera: Conjunctivae normal.      Pupils: Pupils are equal, round, and reactive to light.   Pulmonary:      Effort: Pulmonary effort is normal. No respiratory distress.   Abdominal:      General: There is no distension.      Palpations: Abdomen is soft.      Tenderness: There is no abdominal tenderness.   Musculoskeletal:         General: Normal range of motion.   Skin:     General: Skin is warm and dry.   Neurological:      General: No focal deficit present.      Mental  Status: She is alert and oriented to person, place, and time. Mental status is at baseline.   Psychiatric:         Mood and Affect: Mood normal.         Behavior: Behavior normal.         Thought Content: Thought content normal.         Judgment: Judgment normal.          Assessment:            Problem List Items Addressed This Visit                 Oncology      Monoallelic mutation of BRCA2 gene      Relevant Orders       (Completed)            Plan:          Patient was counseled today on hereditary breast and ovarian cancer syndrome. The lifetime risk for ovarian cancer is approximately 13-29% percent for BRCA2 mutation carriers. Studies have demonstrated that bilateral salpingo-oophorectomy reduces ovarian cancer risk and mortality by approximately 90 percent. Major recommendations for unaffected female BRCA2 mutation carriers is RRBSO typically between age 40-45, when childbearing is completed and taking into account any family history of ovarian cancer. For mutation carriers who have not undergone RRBSO we recommend ovarian cancer surveillance with concurrent TVUS and  q6 months beginning at age 30 or 5 to 10 years before the earliest age of first diagnosis of ovarian cancer in the family. However, we acknowledged and discussed the limitations of screening for ovarian cancer.      Will obtain pelvic US and  now for baseline. She has several other specialty referrals including breast and GI. Will coordinate prioritization of risk reducing surgery as needed pending baseline testing.       I spent approximately 45 minutes reviewing the available records and evaluating the patient, out of which over 50% of the time was spent face to face with the patient in counseling and coordinating this patient's care.         Electronically signed by Lashell Mittal MD at 5/21/2022  8:24 PM

## 2022-07-01 NOTE — ANESTHESIA PROCEDURE NOTES
Intubation    Date/Time: 7/1/2022 1:43 PM  Performed by: Adele Sawyer CRNA  Authorized by: Abhijeet Palacios MD     Intubation:     Induction:  Intravenous    Intubated:  Postinduction    Mask Ventilation:  Easy mask    Attempts:  1    Attempted By:  CRNA    Method of Intubation:  Video laryngoscopy    Blade:  Rondon 3    Laryngeal View Grade: Grade I - full view of cords      Difficult Airway Encountered?: No      Complications:  None    Airway Device:  Oral endotracheal tube    Airway Device Size:  7.0    Style/Cuff Inflation:  Cuffed    Inflation Amount (mL):  4    Tube secured:  20    Placement Verified By:  Capnometry    Complicating Factors:  None    Findings Post-Intubation:  BS equal bilateral    
Yes, Core measure site...

## 2022-07-01 NOTE — OR NURSING
C/o abdominal cramping 8/10.  Drowsy, easily aroused.  Dr Chappell called.  Order for additional oxycodone received.

## 2022-07-01 NOTE — ANESTHESIA POSTPROCEDURE EVALUATION
Anesthesia Post Evaluation    Patient: Linda Arevalo Block    Procedure(s) Performed: Procedure(s) (LRB):  XI ROBOTIC HYSTERECTOMY (N/A)  XI ROBOTIC SALPINGO-OOPHORECTOMY (Bilateral)    Final Anesthesia Type: general      Patient location during evaluation: PACU  Patient participation: Yes- Able to Participate  Level of consciousness: awake and alert  Post-procedure vital signs: reviewed and stable  Pain management: adequate  Airway patency: patent    PONV status at discharge: No PONV  Anesthetic complications: no      Cardiovascular status: blood pressure returned to baseline  Respiratory status: unassisted  Hydration status: euvolemic  Follow-up not needed.          Vitals Value Taken Time   /66 07/01/22 1718   Temp 37 °C (98.6 °F) 07/01/22 1710   Pulse 81 07/01/22 1720   Resp 16 07/01/22 1710   SpO2 94 % 07/01/22 1720   Vitals shown include unvalidated device data.      Event Time   Out of Recovery 17:22:56         Pain/Anamaria Score: Pain Rating Prior to Med Admin: 7 (7/1/2022  4:46 PM)  Pain Rating Post Med Admin: 4 (7/1/2022  5:00 PM)  Anamaria Score: 10 (7/1/2022  4:42 PM)

## 2022-07-01 NOTE — OP NOTE
DATE OF PROCEDURE:  2022     SURGEON:  Lashell Mittal M.D.     ASSISTANTS: OLEKSANDR Hernandez first assist-- no qualified resident was available for her portion of the procedure.      PREOPERATIVE DIAGNOSIS:  1. BRCA2 mutation  2. Risk reducing surgery  3. Pelvic mass      POSTOPERATIVE DIAGNOSES:    1. BRCA2 mutation  2. Risk reducing surgery  3. Pelvic mass     PROCEDURE PERFORMED:  Robotic-assisted total laparoscopic hysterectomy,   bilateral salpingo-oophorectomy     ANESTHESIA:  General endotracheal anesthesia.     SPECIMENS REMOVED:  1.  Uterus and cervix, left fallopian tube and ovary/IP ligament  2.  Right fallopian tube and ovary/IP ligament  3.  Pelvic washings     ESTIMATED BLOOD LOSS:  25cc     COMPLICATIONS:  None.     FINDINGS: 6 week size uterus, normal cervix. Normal appearing left fallopian tube and ovary. Enalrged 8cm right ovary, smooth capsule, removed intact with no spillage. Frozen section consistent with benign dermoid cyst. No ascites. No adenopathy. No obvious evidence of intraperitoneal disease. Mild adhesion of the bladder to anterior uterus from prior  deliveries.      PROCEDURE IN DETAIL:  The patient was taken to the Operating Room.  Informed consent had been obtained. She underwent general endotracheal anesthesia without difficulty, was prepped and draped in the normal sterile fashion in a dorsal lithotomy position.  Timeout was performed.  All parties agreed to the planned procedure. Perioperative antibiotics were administered.  Grajeda catheter was placed under sterile conditions. The VCare uterine manipulator was secured in place in a standard fashion for uterine manipulation. Gloves were changed and attention was turned to the patient's abdomen.      Veress needle was gently inserted.  Intra-abdominal placement was confirmed with low CO2 pressure and water drop test.  Abdomen was insufflated and pneumoperitoneum was obtained.  Skin incision was made superior to the umbilicus.   Robotic trocar was introduced.  Intra-abdominal placement was confirmed.  Additional trocars were placed, 2 robotic trocars to the left of the camera, 1 robotic trocar to the right of the camera and an additional 8 mm assist port to the right of the camera.  The patient was placed in steep Trendelenburg. Robot was docked and operating surgeon reported to the console.      Pelvic washings were obtained. Bilateral round ligaments were identified.  These were cauterized and transected. The posterior leaf of the broad ligament was then opened bilaterally facilitating access to the retroperitoneum. Bilateral IP ligaments were then skeletonized to the level of the pelvic brim. At the level of the pelvic brim they were cauterized and transected. The ureters were noted well below. The anterior leaf of the broad ligament was then opened circumferentially.  Proper plane for the bladder flap was identified and the bladder was gently reflected off of the anterior aspect of the uterus and cervix to the level of the cervicovaginal junction. Bilateral uterine arteries were then skeletonized.  These were cauterized and transected.  The remainder of the uterosacral and cardinal ligaments were then also serially cauterized and transected. Colpotomy was initiated and carried around circumferentially. The right adnexa was detached and placed in a bag. The uterus, cervix, left fallopian tube/oavery were then removed through the vagina. The right ovary was removed as well contained in the bag. Frozen section was consistent with benign dermoid cyst of the right ovary. We then closed the vaginal cuff with a V-Loc running suture in a running fashion.      The robotic trocars were then removed under direct visualization and the robot was undocked. Skin incisions were then rendered hemostatic.  These were closed with 4-0 Monocryl in a subcuticular fashion and topped with sterile Dermabond.  The patient tolerated the procedure well.  Sponge,  lap, needle and instrument counts were correct x2 as reported by the circulating nurse.  She was awakened from anesthesia and taken to recovery in stable condition.

## 2022-07-02 ENCOUNTER — PATIENT MESSAGE (OUTPATIENT)
Dept: SURGERY | Facility: OTHER | Age: 47
End: 2022-07-02
Payer: COMMERCIAL

## 2022-07-02 NOTE — PLAN OF CARE
Linda Acosta has met all discharge criteria from Phase II. Vital Signs are stable, ambulating  without difficulty.Pain is now under control and tolerable for the pt. Pain score 4 at this time.  Discharge instructions given, patient verbalized understanding. Discharged from facility via wheelchair in stable condition.

## 2022-07-02 NOTE — OR NURSING
Pt has ambulated in hallway, and still unable to void.  Bladder scan shows 125 ml in bladder.  Dr. Rodríguez notified.  States to give pt until 2200 to void before re- inserting the goncalves.

## 2022-07-04 VITALS
OXYGEN SATURATION: 96 % | WEIGHT: 125 LBS | TEMPERATURE: 98 F | SYSTOLIC BLOOD PRESSURE: 97 MMHG | HEIGHT: 68 IN | RESPIRATION RATE: 16 BRPM | BODY MASS INDEX: 18.94 KG/M2 | DIASTOLIC BLOOD PRESSURE: 63 MMHG | HEART RATE: 86 BPM

## 2022-07-07 LAB
FINAL PATHOLOGIC DIAGNOSIS: NORMAL
Lab: NORMAL

## 2022-07-11 LAB
FINAL PATHOLOGIC DIAGNOSIS: NORMAL
FROZEN SECTION DIAGNOSIS: NORMAL
FROZEN SECTION FOOTNOTE: NORMAL
GROSS: NORMAL
Lab: NORMAL

## 2022-07-11 NOTE — DISCHARGE SUMMARY
[facility]  Obstetrics & Gynecology  Discharge Summary    Patient Name: Linda Acosta  MRN: [id number]  Admission Date: 7/1/2022  Hospital Length of Stay: 0 days  Discharge Date and Time: 7/1/2022 10:34 PM  Attending Physician: No att. providers found   Discharging Provider: Lashell Mittal MD  Primary Care Provider: Cesar Zambrano MD    HPI:   PREOPERATIVE DIAGNOSIS:  1. BRCA2 mutation  2. Risk reducing surgery  3. Pelvic mass    Hospital Course:   Patient presented for scheduled procedure. Patient was passed back to OR for the planned procedure.  Please see OP note for further details. Tolerated procedure well and patient was taken to recovery in a stable condition. Prior to discharge patient was able to void, ambulate, tolerate PO and pain was well controlled with PO meds. Patient was given routine post-op instructions for which patient voiced understanding. Patient was subsequently discharged home.      Procedure(s) (LRB):  XI ROBOTIC HYSTERECTOMY (N/A)  XI ROBOTIC SALPINGO-OOPHORECTOMY (Bilateral)     Consults:     Significant Diagnostic Studies:   Specimen (24h ago, onward)            None          Pending Diagnostic Studies:     Procedure Component Value Units Date/Time    Specimen to Pathology, Surgery Gynecology and Obstetrics [[id number]] Collected: 07/01/22 1500    Order Status: Sent Lab Status: In process Updated: 07/01/22 2730    Specimen: Tissue         There are no hospital problems to display for this patient.       Discharged Condition: good    Disposition: Home or Self Care    Follow Up:   Follow-up Information     Lashell Mittal MD Follow up in 6 week(s).    Specialty: Gynecologic Oncology  Why: Post-Op Follow Up  Contact information:  Jair MARTINEZ [address]SEL[address]  [facility] [address]  [phone]                       Patient Instructions:      Diet Adult Regular     Lifting restrictions   Order Comments: No lifting heavier than 20 lbs     No driving until:   Order  Comments: No driving while taking narcotics     Pelvic Rest   Order Comments: NOTHING in vagina until seen in clinic     Notify your health care provider if you experience any of the following:  temperature >100.4     Notify your health care provider if you experience any of the following:  persistent nausea and vomiting or diarrhea     Notify your health care provider if you experience any of the following:  severe uncontrolled pain     Notify your health care provider if you experience any of the following:  redness, tenderness, or signs of infection (pain, swelling, redness, odor or green/yellow discharge around incision site)     Notify your health care provider if you experience any of the following:   Order Comments: Vaginal bleeding heavier than a period for 2 hours or more     Activity as tolerated     Medications:  Reconciled Home Medications:      Medication List      START taking these medications    MAPAP ARTHRITIS PAIN 650 MG Tbsr  Generic drug: acetaminophen  Take 1 tablet (650 mg total) by mouth every 6 to 8 hours as needed (Pain). Alternate every 3 hours with ibuprofen.     oxyCODONE 5 MG immediate release tablet  Commonly known as: ROXICODONE  Take 1 tablet (5 mg total) by mouth every 4 (four) hours as needed for Pain.        CHANGE how you take these medications    * ibuprofen 600 MG tablet  Commonly known as: ADVIL,MOTRIN  Take 1 tablet (600 mg total) by mouth every 6 (six) hours as needed for Pain. Alternate every 3 hours with tylenol  What changed: You were already taking a medication with the same name, and this prescription was added. Make sure you understand how and when to take each.     * ibuprofen 200 MG tablet  Commonly known as: ADVIL,MOTRIN  Take 400 mg by mouth every 6 (six) hours as needed for Pain.  What changed: Another medication with the same name was added. Make sure you understand how and when to take each.         * This list has 2 medication(s) that are the same as other  medications prescribed for you. Read the directions carefully, and ask your doctor or other care provider to review them with you.            CONTINUE taking these medications    albuterol 90 mcg/actuation inhaler  Commonly known as: PROVENTIL/VENTOLIN HFA  ProAir HFA 90 mcg/actuation aerosol inhaler     azelastine 137 mcg (0.1 %) nasal spray  Commonly known as: ASTELIN  USE 1 SPRAY TWICE A DAY INTO EACH NOSTRIL     clotrimazole-betamethasone 1-0.05% cream  Commonly known as: LOTRISONE  clotrimazole-betamethasone 1 %-0.05 % topical cream     ENSKYCE 0.15-0.03 mg per tablet  Generic drug: desogestreL-ethinyl estradioL     loratadine 10 mg tablet  Commonly known as: CLARITIN  Claritin     VYVANSE 20 MG capsule  Generic drug: lisdexamfetamine     XIIDRA 5 % Dpet  Generic drug: lifitegrast  INSTILL 1 DROP TWICE A DAY INTO BOTH EYES            Lashell Mittal MD  Obstetrics & Gynecology  [facility])

## 2022-07-18 ENCOUNTER — PATIENT MESSAGE (OUTPATIENT)
Dept: GYNECOLOGIC ONCOLOGY | Facility: CLINIC | Age: 47
End: 2022-07-18
Payer: COMMERCIAL

## 2022-07-22 ENCOUNTER — TELEPHONE (OUTPATIENT)
Dept: GYNECOLOGIC ONCOLOGY | Facility: CLINIC | Age: 47
End: 2022-07-22
Payer: COMMERCIAL

## 2022-07-22 ENCOUNTER — PATIENT MESSAGE (OUTPATIENT)
Dept: SURGERY | Facility: CLINIC | Age: 47
End: 2022-07-22
Payer: COMMERCIAL

## 2022-07-22 DIAGNOSIS — Z15.01 MONOALLELIC MUTATION OF BRCA2 GENE: Primary | ICD-10-CM

## 2022-07-22 DIAGNOSIS — Z15.09 MONOALLELIC MUTATION OF BRCA2 GENE: Primary | ICD-10-CM

## 2022-07-22 NOTE — TELEPHONE ENCOUNTER
"----- Message from Caleb Spain sent at 7/22/2022  9:49 AM CDT -----  Scheduling Request        Patient Status: Est      Scheduling Appt:  Post-op F/u      Time/Date Preference:  Mid August      Contact Preference?: [phone]       Treating Provider: Kyrie      Do you feel you need to be seen today? No      Additional Notes:  "Thank you for all that you do for our patients"     "

## 2022-07-28 DIAGNOSIS — T78.40XA RASH DUE TO ALLERGY: Primary | ICD-10-CM

## 2022-07-28 DIAGNOSIS — R21 RASH DUE TO ALLERGY: Primary | ICD-10-CM

## 2022-07-28 RX ORDER — METHYLPREDNISOLONE 4 MG/1
TABLET ORAL
Qty: 21 EACH | Refills: 0 | Status: SHIPPED | OUTPATIENT
Start: 2022-07-28 | End: 2022-08-18

## 2022-08-04 ENCOUNTER — PATIENT MESSAGE (OUTPATIENT)
Dept: GYNECOLOGIC ONCOLOGY | Facility: CLINIC | Age: 47
End: 2022-08-04
Payer: COMMERCIAL

## 2022-08-11 ENCOUNTER — PATIENT MESSAGE (OUTPATIENT)
Dept: HEMATOLOGY/ONCOLOGY | Facility: CLINIC | Age: 47
End: 2022-08-11
Payer: COMMERCIAL

## 2022-08-16 ENCOUNTER — OFFICE VISIT (OUTPATIENT)
Dept: SURGERY | Facility: CLINIC | Age: 47
End: 2022-08-16
Payer: COMMERCIAL

## 2022-08-16 VITALS
HEIGHT: 68 IN | WEIGHT: 121 LBS | BODY MASS INDEX: 18.34 KG/M2 | SYSTOLIC BLOOD PRESSURE: 102 MMHG | DIASTOLIC BLOOD PRESSURE: 69 MMHG | HEART RATE: 89 BPM

## 2022-08-16 DIAGNOSIS — Z15.01 MONOALLELIC MUTATION OF BRCA2 GENE: ICD-10-CM

## 2022-08-16 DIAGNOSIS — Z15.09 MONOALLELIC MUTATION OF BRCA2 GENE: ICD-10-CM

## 2022-08-16 DIAGNOSIS — Z12.39 ENCOUNTER FOR SCREENING BREAST EXAMINATION: ICD-10-CM

## 2022-08-16 DIAGNOSIS — Z91.89 AT HIGH RISK FOR BREAST CANCER: Primary | ICD-10-CM

## 2022-08-16 PROCEDURE — 99203 OFFICE O/P NEW LOW 30 MIN: CPT | Mod: S$GLB,,, | Performed by: SURGERY

## 2022-08-16 PROCEDURE — 99203 PR OFFICE/OUTPT VISIT, NEW, LEVL III, 30-44 MIN: ICD-10-PCS | Mod: S$GLB,,, | Performed by: SURGERY

## 2022-08-16 PROCEDURE — 3078F PR MOST RECENT DIASTOLIC BLOOD PRESSURE < 80 MM HG: ICD-10-PCS | Mod: CPTII,S$GLB,, | Performed by: SURGERY

## 2022-08-16 PROCEDURE — 3008F PR BODY MASS INDEX (BMI) DOCUMENTED: ICD-10-PCS | Mod: CPTII,S$GLB,, | Performed by: SURGERY

## 2022-08-16 PROCEDURE — 99999 PR PBB SHADOW E&M-EST. PATIENT-LVL IV: CPT | Mod: PBBFAC,,, | Performed by: SURGERY

## 2022-08-16 PROCEDURE — 3074F PR MOST RECENT SYSTOLIC BLOOD PRESSURE < 130 MM HG: ICD-10-PCS | Mod: CPTII,S$GLB,, | Performed by: SURGERY

## 2022-08-16 PROCEDURE — 99999 PR PBB SHADOW E&M-EST. PATIENT-LVL IV: ICD-10-PCS | Mod: PBBFAC,,, | Performed by: SURGERY

## 2022-08-16 PROCEDURE — 3074F SYST BP LT 130 MM HG: CPT | Mod: CPTII,S$GLB,, | Performed by: SURGERY

## 2022-08-16 PROCEDURE — 3008F BODY MASS INDEX DOCD: CPT | Mod: CPTII,S$GLB,, | Performed by: SURGERY

## 2022-08-16 PROCEDURE — 3078F DIAST BP <80 MM HG: CPT | Mod: CPTII,S$GLB,, | Performed by: SURGERY

## 2022-08-16 NOTE — PROGRESS NOTES
[facility]  Department of Surgery  HIGH RISK      Referring provider:    Marcin Soto, ALEXANDRA  [address] [place] [address]    PCP:  Cesar Zambrano MD    HIGH RISK    Subjective:     Linda Acosta is a 47 y.o. premenopausal female referred for evaluation of increased risk of breast cancer based on +BRCA2 mutation diagnosed 3/2022. Here today to discussed options of high risk screening and risk reduction.    Recently underwent Robotic hysterectomy and salpingo-oophorectomy 2022 with Dr. Mittal.       Gynecologic History:  Age of menarche was 15  Last menstrual period was 2022.  Age of menopause was N/A.      Patient denies hormonal therapy.   Patient is .    Age of first live birth was 32.    Patient did breast feed.     Medical History is significant for the following:  Past Medical History:   Diagnosis Date    Asthma     Chronic diarrhea     GERD (gastroesophageal reflux disease)     Irritable bowel syndrome        Family History is significant for the following:  Family History   Problem Relation Age of Onset    Thyroid cancer Father 48        type?    BRCA 1/2 Father         BRCA2+    Other Sister         BRCA-negative    Genetic Disorder Sister         MUTYH mutation 536A>G ([id number]) heterozygous    Colon polyps Sister     Breast cancer Paternal Grandmother         50s? unilat(?)    No Known Problems Daughter     GI problems Son         undiagnosed thus far    Other Paternal Aunt         BRCA-negative    Breast cancer Paternal Uncle 75    BRCA 1/2 Paternal Uncle         BRCA2 c.[id number] (p.[id number]*22), Heterozygous, DELETERIOUS    Celiac disease Other     Cancer Maternal Cousin         throat(?)    Genetic Disorder Paternal Cousin         (BRCA2?)    Esophageal cancer Neg Hx     Colon cancer Neg Hx     Cirrhosis Neg Hx     Crohn's disease Neg Hx     Hemochromatosis Neg Hx     Inflammatory bowel disease Neg Hx     Irritable bowel syndrome Neg Hx      "Liver cancer Neg Hx     Liver disease Neg Hx     Rectal cancer Neg Hx     Stomach cancer Neg Hx     Ulcerative colitis Neg Hx     Pancreatic cancer Neg Hx        Social History:   Social History     Socioeconomic History    Marital status:    Tobacco Use    Smoking status: Former Smoker     Types: Cigarettes    Smokeless tobacco: Never Used   Substance and Sexual Activity    Alcohol use: Yes     Comment: Rarely now but frequent drinking in college    Drug use: Never    Sexual activity: Yes     Partners: Male     Birth control/protection: Pill   Social History Narrative    She was  once for 6 years .    She is currently single but engaged to be .    She has 2 healthy children born in 2008 and 2012.    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       Review of Systems  Review of Systems   Constitutional: Negative for chills, fever and weight loss.   Eyes: Negative for blurred vision, double vision, pain, discharge and redness.   Respiratory: Negative for cough and wheezing.    Cardiovascular: Negative for chest pain and leg swelling.   Gastrointestinal: Negative for blood in stool.   Musculoskeletal: Negative for back pain, falls and neck pain.   Skin: Negative for rash.   Neurological: Negative for weakness and headaches.          Objective:   /69 (BP Location: Left arm, Patient Position: Sitting, BP Method: Small (Automatic))   Pulse 89   Ht 5' 8" (1.727 m)   Wt 54.9 kg (121 lb)   LMP 06/20/2022 (Exact Date)   BMI 18.40 kg/m²     Physical Exam   Constitutional: She is oriented to person, place, and time. She appears well-developed and well-nourished.   HENT:   Head: Normocephalic and atraumatic.   Eyes: Pupils are equal, round, and reactive to light. Right eye exhibits no discharge. Left eye exhibits no discharge.   Cardiovascular: Normal rate, regular rhythm, normal heart sounds and intact distal pulses.    Pulmonary/Chest: Effort normal and breath sounds normal. No " respiratory distress. She has no wheezes. Right breast exhibits no inverted nipple, no mass, no nipple discharge, no skin change and no tenderness. Left breast exhibits no inverted nipple, no mass, no nipple discharge, no skin change and no tenderness.   Musculoskeletal: Normal range of motion.   Lymphadenopathy:     She has no cervical adenopathy.   Neurological: She is alert and oriented to person, place, and time.   Skin: Skin is warm and dry. No erythema.           Assessment:     This is a 47 y.o. female with an increased risk of breast cancer based on BRCA2 mutation.       Plan:   Given her BRCA mutation status, her lifetime risk of breast cancer is estimated to be 60-80% and her risk of ovarian cancer 15-30%.  We had a discussion regarding risk reduction strategies including active surveillance or prophylactic mastectomy for breast cancer risk and oral contraceptives or risk reducing salpingo-oophorectomy  to reduce risk of ovarian cancer.       Specifically with respect to breast cancer, we discussed timing of these potential interventions, as well as the procedure of bilateral prophylactic mastectomy and reconstructive options.     Discussed risk models can vary based on the model used.  There are several models available and we currently use TC model for our patients with family history.  Based on this, the patient's risk exceeds 20%. Patients with lifetime risk over 20% qualify for increased screening with MRI, in addition to mammograms.  We also would perform clinical breast exams every 6 months.  Discussed pros and cons of MRI screening.    We also discussed risk reduction options such as a healthy diet including fresh fruits, green leafy vegetables and lean meats. Avoidance of processed foods.    Exercise I recommend at least 30 minutes of cardiovascular exercise 4-5 times per week, even walking would have benefit.  Discussed that exercise can lower the relative risk of breast cancer by about 18-20%.   Also discussed that obesity is linked to higher risk of breast cancer, therefore exercise in important.    Discussed alcohol use and some studies suggest that increase alcohol intake may increase breast cancer risk.  Therefore, I do recommend limited alcohol intake.    Also discussed risk factors that are not modifiable, such as age at menarche, age at menopause, age at first pregnancy, and family history.     We did discuss hormone replacement therapy as well.  In patients at increased risk, I usually do not recommend HRT for long periods of time.      Discussed briefly risk reduction options with chemoprevention, such as Tamoxifen or Raloxifene.  These have been shown to lower the risk of breast cancer incidence, however have not been shown to improve survival in patients who do not have a breast cancer.    Return to clinic in 6 months for Clinical Breast Exam. Will alternate Mammogram and MRI every 6 months. She is not ready for surgery at this time    The patient is in agreement with the plan. Questions were encouraged and answered to patient's satisfaction. Linda will call our office with any questions or concerns.     I have spent 45 minutes on this encounter, 30 minutes of which were spent face to face counseling and 15 minutes on chart review and coordination of care.

## 2022-08-24 ENCOUNTER — OFFICE VISIT (OUTPATIENT)
Dept: GYNECOLOGIC ONCOLOGY | Facility: CLINIC | Age: 47
End: 2022-08-24
Payer: COMMERCIAL

## 2022-08-24 ENCOUNTER — OFFICE VISIT (OUTPATIENT)
Dept: OBSTETRICS AND GYNECOLOGY | Facility: CLINIC | Age: 47
End: 2022-08-24
Payer: COMMERCIAL

## 2022-08-24 VITALS
BODY MASS INDEX: 18.67 KG/M2 | HEIGHT: 68 IN | DIASTOLIC BLOOD PRESSURE: 95 MMHG | SYSTOLIC BLOOD PRESSURE: 112 MMHG | HEART RATE: 92 BPM | WEIGHT: 123.19 LBS

## 2022-08-24 VITALS
HEIGHT: 69 IN | SYSTOLIC BLOOD PRESSURE: 108 MMHG | DIASTOLIC BLOOD PRESSURE: 67 MMHG | HEART RATE: 89 BPM | WEIGHT: 122 LBS | BODY MASS INDEX: 18.07 KG/M2

## 2022-08-24 DIAGNOSIS — R19.00 PELVIC MASS: ICD-10-CM

## 2022-08-24 DIAGNOSIS — N95.1 MENOPAUSAL SYMPTOMS: Primary | ICD-10-CM

## 2022-08-24 DIAGNOSIS — Z15.09 MONOALLELIC MUTATION OF BRCA2 GENE: ICD-10-CM

## 2022-08-24 DIAGNOSIS — Z15.01 MONOALLELIC MUTATION OF BRCA2 GENE: Primary | ICD-10-CM

## 2022-08-24 DIAGNOSIS — Z15.01 MONOALLELIC MUTATION OF BRCA2 GENE: ICD-10-CM

## 2022-08-24 DIAGNOSIS — Z15.09 MONOALLELIC MUTATION OF BRCA2 GENE: Primary | ICD-10-CM

## 2022-08-24 DIAGNOSIS — Z86.32 HISTORY OF GESTATIONAL DIABETES: ICD-10-CM

## 2022-08-24 PROCEDURE — 3008F BODY MASS INDEX DOCD: CPT | Mod: CPTII,S$GLB,, | Performed by: OBSTETRICS & GYNECOLOGY

## 2022-08-24 PROCEDURE — 99214 PR OFFICE/OUTPT VISIT, EST, LEVL IV, 30-39 MIN: ICD-10-PCS | Mod: 24,S$GLB,, | Performed by: PHYSICIAN ASSISTANT

## 2022-08-24 PROCEDURE — 3078F DIAST BP <80 MM HG: CPT | Mod: CPTII,S$GLB,, | Performed by: OBSTETRICS & GYNECOLOGY

## 2022-08-24 PROCEDURE — 3008F BODY MASS INDEX DOCD: CPT | Mod: CPTII,S$GLB,, | Performed by: PHYSICIAN ASSISTANT

## 2022-08-24 PROCEDURE — 1159F MED LIST DOCD IN RCRD: CPT | Mod: CPTII,S$GLB,, | Performed by: PHYSICIAN ASSISTANT

## 2022-08-24 PROCEDURE — 3074F SYST BP LT 130 MM HG: CPT | Mod: CPTII,S$GLB,, | Performed by: OBSTETRICS & GYNECOLOGY

## 2022-08-24 PROCEDURE — 1159F PR MEDICATION LIST DOCUMENTED IN MEDICAL RECORD: ICD-10-PCS | Mod: CPTII,S$GLB,, | Performed by: PHYSICIAN ASSISTANT

## 2022-08-24 PROCEDURE — 99999 PR PBB SHADOW E&M-EST. PATIENT-LVL III: CPT | Mod: PBBFAC,,, | Performed by: OBSTETRICS & GYNECOLOGY

## 2022-08-24 PROCEDURE — 3074F SYST BP LT 130 MM HG: CPT | Mod: CPTII,S$GLB,, | Performed by: PHYSICIAN ASSISTANT

## 2022-08-24 PROCEDURE — 3074F PR MOST RECENT SYSTOLIC BLOOD PRESSURE < 130 MM HG: ICD-10-PCS | Mod: CPTII,S$GLB,, | Performed by: PHYSICIAN ASSISTANT

## 2022-08-24 PROCEDURE — 3080F PR MOST RECENT DIASTOLIC BLOOD PRESSURE >= 90 MM HG: ICD-10-PCS | Mod: CPTII,S$GLB,, | Performed by: PHYSICIAN ASSISTANT

## 2022-08-24 PROCEDURE — 99999 PR PBB SHADOW E&M-EST. PATIENT-LVL IV: CPT | Mod: PBBFAC,,, | Performed by: PHYSICIAN ASSISTANT

## 2022-08-24 PROCEDURE — 3008F PR BODY MASS INDEX (BMI) DOCUMENTED: ICD-10-PCS | Mod: CPTII,S$GLB,, | Performed by: PHYSICIAN ASSISTANT

## 2022-08-24 PROCEDURE — 99024 POSTOP FOLLOW-UP VISIT: CPT | Mod: S$GLB,,, | Performed by: OBSTETRICS & GYNECOLOGY

## 2022-08-24 PROCEDURE — 1160F PR REVIEW ALL MEDS BY PRESCRIBER/CLIN PHARMACIST DOCUMENTED: ICD-10-PCS | Mod: CPTII,S$GLB,, | Performed by: PHYSICIAN ASSISTANT

## 2022-08-24 PROCEDURE — 99999 PR PBB SHADOW E&M-EST. PATIENT-LVL IV: ICD-10-PCS | Mod: PBBFAC,,, | Performed by: PHYSICIAN ASSISTANT

## 2022-08-24 PROCEDURE — 99024 PR POST-OP FOLLOW-UP VISIT: ICD-10-PCS | Mod: S$GLB,,, | Performed by: OBSTETRICS & GYNECOLOGY

## 2022-08-24 PROCEDURE — 3074F PR MOST RECENT SYSTOLIC BLOOD PRESSURE < 130 MM HG: ICD-10-PCS | Mod: CPTII,S$GLB,, | Performed by: OBSTETRICS & GYNECOLOGY

## 2022-08-24 PROCEDURE — 99214 OFFICE O/P EST MOD 30 MIN: CPT | Mod: 24,S$GLB,, | Performed by: PHYSICIAN ASSISTANT

## 2022-08-24 PROCEDURE — 99999 PR PBB SHADOW E&M-EST. PATIENT-LVL III: ICD-10-PCS | Mod: PBBFAC,,, | Performed by: OBSTETRICS & GYNECOLOGY

## 2022-08-24 PROCEDURE — 1160F RVW MEDS BY RX/DR IN RCRD: CPT | Mod: CPTII,S$GLB,, | Performed by: PHYSICIAN ASSISTANT

## 2022-08-24 PROCEDURE — 3080F DIAST BP >= 90 MM HG: CPT | Mod: CPTII,S$GLB,, | Performed by: PHYSICIAN ASSISTANT

## 2022-08-24 PROCEDURE — 3078F PR MOST RECENT DIASTOLIC BLOOD PRESSURE < 80 MM HG: ICD-10-PCS | Mod: CPTII,S$GLB,, | Performed by: OBSTETRICS & GYNECOLOGY

## 2022-08-24 PROCEDURE — 3008F PR BODY MASS INDEX (BMI) DOCUMENTED: ICD-10-PCS | Mod: CPTII,S$GLB,, | Performed by: OBSTETRICS & GYNECOLOGY

## 2022-08-24 RX ORDER — ESTRADIOL 0.05 MG/D
1 FILM, EXTENDED RELEASE TRANSDERMAL
Qty: 8 PATCH | Refills: 11 | Status: SHIPPED | OUTPATIENT
Start: 2022-08-25 | End: 2022-12-16

## 2022-08-24 NOTE — LETTER
August 24, 2022        Glenn Marsh MD  [address]  [place] [address]             [facility]  [address], [address]  [facility] [address]  Phone: [phone]  Fax: [phone]   Patient: Linda Acosta   MR Number: [id number]   YOB: 1975   Date of Visit: 8/24/2022       Dear Dr. Marsh:    Thank you for referring Linda Acosta to me for evaluation. Attached you will find relevant portions of my assessment and plan of care.    If you have questions, please do not hesitate to call me. I look forward to following Linda Acosta along with you.    Sincerely,      Lashell Mittal MD            CC  No Recipients    Enclosure

## 2022-08-24 NOTE — PROGRESS NOTES
Subjective:      Patient ID: Linda Acosta is a 47 y.o. female.    Chief Complaint: Post-op Evaluation      HPI    S/p risk reducing RTLH/BSO 7/1/2022  Final pathology reviewed and benign. Negative cytologic washings. And benign ovarian teratoma.    Presents today for post operative visit. Recovering appropriately from surgery. Up and about, eating, +BM.  Endorses some low back back and R hip pain. Presents prior to surgery but is more bothersome to her since surgery.   _____________  Referral history:  Referred by Marcin Soto. Patient presents to clinic for evaluation of a BRCA 2 mutation. Patient has an appointment with the high [facility] breast [facility] and Dr. Chisholm in 6/2022.     Patient's father, paternal uncle, and cousin have also tested positive for BRCA 2. She denies family history of ovarian cancer.      Prior abdominal surgeries include c/s x 2.      Primary ob/gyn is Dr. Glenn Marsh.      Review of Systems   Constitutional: Negative for appetite change, chills, fatigue and fever.   HENT: Negative for mouth sores.    Respiratory: Negative for cough and shortness of breath.    Cardiovascular: Negative for leg swelling.   Gastrointestinal: Negative for abdominal pain, blood in stool, constipation and diarrhea.   Endocrine: Negative for cold intolerance.   Genitourinary: Negative for dysuria and vaginal bleeding.   Musculoskeletal: Negative for myalgias.   Skin: Negative for rash.   Allergic/Immunologic: Negative.    Neurological: Negative for weakness and numbness.   Hematological: Negative for adenopathy. Does not bruise/bleed easily.   Psychiatric/Behavioral: Negative for confusion.       Objective:   Physical Exam:   Constitutional: She is oriented to person, place, and time. She appears well-developed and well-nourished.    HENT:   Head: Normocephalic and atraumatic.    Eyes: Pupils are equal, round, and reactive to light. EOM are normal.    Neck: No thyromegaly present.    Cardiovascular: Normal  rate, regular rhythm and intact distal pulses.     Pulmonary/Chest: Effort normal and breath sounds normal. No respiratory distress. She has no wheezes.        Abdominal: Soft. Bowel sounds are normal. She exhibits abdominal incision. She exhibits no distension and no mass. There is no abdominal tenderness.     Genitourinary:    Vagina normal.      Pelvic exam was performed with patient supine.   There is no lesion on the right labia. There is no lesion on the left labia. There is an absent right adnexa and an absent left adnexa. Cervix is absent.Uterus is absent.    Genitourinary Comments: Vaginal cuff healing appropriately. Some suture still visible. Cuff intact, no bleeding.              Musculoskeletal: Normal range of motion and moves all extremeties.      Lymphadenopathy:     She has no cervical adenopathy.        Right: No supraclavicular adenopathy present.        Left: No supraclavicular adenopathy present.    Neurological: She is alert and oriented to person, place, and time.    Skin: Skin is warm and dry. No rash noted.    Psychiatric: She has a normal mood and affect.       Assessment:     1. Monoallelic mutation of BRCA2 gene    2. Pelvic mass        Plan:   No orders of the defined types were placed in this encounter.    S/p risk reducing surgery with benign pathology.  Recovering appropriately from surgery.

## 2022-08-25 ENCOUNTER — PATIENT MESSAGE (OUTPATIENT)
Dept: OBSTETRICS AND GYNECOLOGY | Facility: CLINIC | Age: 47
End: 2022-08-25
Payer: COMMERCIAL

## 2022-08-25 NOTE — PROGRESS NOTES
Subjective:      Linda Acosta is a 47 y.o. female who presents to discuss menopausal symptoms. Menarche at age 15. She is . She did have gestation diabetes with one pregnancy. Positive BRCA1 mutation s/p hyst/BSO on 2022 with Dr. Mittal. She is recovering well from her surgery. Met with Dr. Hamilton with breast surgery to discuss prophylactic bilateral mastectomy. Has decided to delay a little bit until recovered from hyst/BSO. She will start breast MRI/ mammogram every 6 months. She is also followed by Marcin Soto with genetics, Dr. Landon with Gastro, and sees dermatology.  Reports hot flashes during the day and night. They are impacting her sleep. She is fatigued throughout the day and her libido is low. Denies vaginal dryness or dyspareunia. Her mood is good, denies changes in anxiety or irritability. Had low back pain prior to surgery but seems to be worse now.  She is interested in HRT for health benefits but concerns due to increased risk for breast cancer.   No prior cardiac history, family history of MI prior to age 50, or personal history of pre-eclampsia. She denies the following contraindications to HRT:  Vaginal bleeding, history of VTE/PE, thrombophilia,  breast cancer, or active liver disease.       PCP: Cesar Zambrano MD  Routine labs: Annually with PCP  Pap smear: No result found  Mammogram: Breast MRI scheduled      Admission on 2022, Discharged on 2022   Component Date Value Ref Range Status    POC Preg Test, Ur 2022 Negative  Negative Final     Acceptable 2022 Yes   Final    POCT Glucose 2022 86  70 - 110 mg/dL Final    Final Pathologic Diagnosis 2022 Negative for malignant cells.  Reactive mesothelial cells.   Final    Disclaimer 2022    Final                    Value:Screening was performed at [facility], [address] [place] [address].      Final  "Pathologic Diagnosis 2022    Final                    Value:1. UTERUS, CERVIX AND LEFT ADNEXA WEIGHING 98 G SHOWING:  EARLY SECRETORY ENDOMETRIUM  CERVIX WITH FOLLICULAR CERVICITIS AND FOCAL SQUAMOUS METAPLASIA, NO DYSPLASIA  IDENTIFIED  THE LEFT OVARY SHOWS SMALL BENIGN FOLLICLE CYSTS.  THE LEFT FALLOPIAN TUBE IS  UNREMARKABLE.  NOTE:  THE LEFT TUBE AND OVARY WERE SERIALLY SECTIONED AND ENTIRELY  SUBMITTED.  NO DYSPLASIA OR NEOPLASIA IDENTIFIED.  2. THE RIGHT OVARY SHOWS A BENIGN CYSTIC TERATOMA AND UNREMARKABLE RIGHT  FALLOPIAN TUBE.  NO NEOPLASIA IDENTIFIED.  THE RIGHT OVARY AND FALLOPIAN TUBE  WERE SERIALLY SECTIONED AND ENTIRELY SUBMITTED      Frozen Section Diagnosis 2022    Final                    Value:Speicmen 2: mature cystic teratoma  Dr. Khan reported results to Dr. Mittal at 1530 on 2022      Gross 2022    Final                    Value:Pathology ID: UBS-     :  1975     MRN:  [id number]    Part 1: Received fresh and subsequently placed in formalin labeled as  "uterus, cervix, left tube and ovary" is a 98 g total hysterectomy with  attached left fallopian tube and ovary.  The uterus (8.2 cm superior to  inferior x 4.7 cm right to left x 3.7 cm anterior to posterior) has a  red-purple glistening serosal surface.  The cervix (4.0 cm in diameter) has  pink-white ectocervical mucosa with a round cervical os (0.8 cm in diameter).   The specimen is bivalved to reveal a pink-tan slightly herringbone  endocervical canal (2.5 cm in length x 0.9 cm in average width).  The  endometrial cavity (approximately 1.5 cm cornu to cornu x 4.2 cm in length)  has sparse red-pink endometrium, with an average endometrial thickness  measuring 0.1 cm.  The underlying myometrium is tan-white, rubbery, with  average thickness measuring 1.6 cm.  No myometrial masses are identified.    The attached left fallopian tube (8.0 cm in morenita                          gth x 0.6 cm in average  diameter) " "has a purple-gray glistening serosal surface.  The fimbriated ends  are unremarkable.  The fallopian tube is serially sectioned reveal an  unremarkable patent lumen (0.1 cm in diameter).  The attached left ovary (3.3  x 1.6 x 1.3 cm, 4 g) has a tan-white to tan-yellow slightly bosselated  external surface.  It is serially sectioned to reveal pink-tan to tan-gray  fibrotic parenchyma with a single unilocular cyst (1.2 x 0.8 x 0.8 cm),  containing clear serous fluid.  No cystic excrescences are identified.  No  solid ovarian masses are grossly identified.  Representative sections are  submitted as follows (fallopian tube and ovary entirely submitted):  BZO--1-A:  Anterior cervix  ASB--1-B:  Posterior cervix  TVX--1-C:  Anterior endomyometrial cross-section with serosa  CIU--1-D:  Posterior endomyometrial cross-section with serosa  JEG--1-E to TGW--1-G:  Fallopian tube cross-sections, entirely  submitted from pro                          ximal to distal  YIG--1-H:  Fallopian tube fimbriated ends, serially sectioned  (fallopian tube entirely submitted per [facility] protocol)  JNU--1-I to ZLH--1-O:  Left ovary, serially sectioned and  entirely submitted    Date received/opened:  07/01/2022  Date grossed:  07/05/2022      Part 2: Received fresh for frozen section analysis and subsequently placed in  formalin labeled as "right ovary and tube (frozen)" is a clinically  designated en bloc right salpingo-oophorectomy.  The ovary (8.3 x 6.7 x 4.8  cm, 127 g) is red-pink, glistening, and grossly cystic.  The ovary is opened  to reveal tan-yellow caseous material with sparse blond hair.  Two sessile  protuberances are identified (0.2 x 0.2 x 0.2 cm and 0.3 x 0.2 x 0.2 cm).  No  additional areas of interest are grossly identified.  The cyst wall measures  0.2 cm in average diameter.  Peripherally located is a tan-yellow cystic  corpus luteum (1.4 cm in greatest " dimension), with adjacent possible  uninvolved sli                          ghtly edematous parenchyma.  No solid masses are grossly  appreciated.  The attached fallopian tube (6.2 cm in length x 0.5 cm in  diameter) is red-pink, tortuous, and has unremarkable fimbriated ends.  The  fallopian tube is serially sectioned to reveal an unremarkable patent  pinpoint lumen.  Representative sections are submitted as follows:  QCZ--2-A-FS:  Frozen section control-sessile protuberances (entirely  submitted) and cystic corpus luteum  NJT--2-B to HRJ--2-D:  Ovary-cyst wall, on edge  MRJ--2-E:  Ovary-cystic corpus luteum with ovarian parenchyma  NVD--2-F and KWC--2-G:  Fallopian tube cross-sections, entirely  submitted from proximal to distal  JCK--2-H:  Fallopian tube fimbriated ends, serially sectioned ([facility]  protocol)    -Salma Hart MS, PA([facility])      Frozen Section Footnote 07/01/2022    Final                    Value:Frozen section performed at [facility], [address] [place] [address], [id number]      Disclaimer 07/01/2022    Final                    Value:Unless the case is a 'gross only' or additional testing only, the final  diagnosis for each specimen is based on a microscopic examination of  appropriate tissue sections.     Hospital Outpatient Visit on 06/30/2022   Component Date Value Ref Range Status    Sodium 06/30/2022 140  136 - 145 mmol/L Final    Potassium 06/30/2022 4.4  3.5 - 5.1 mmol/L Final    Chloride 06/30/2022 104  95 - 110 mmol/L Final    CO2 06/30/2022 25  23 - 29 mmol/L Final    Glucose 06/30/2022 81  70 - 110 mg/dL Final    BUN 06/30/2022 13  6 - 20 mg/dL Final    Creatinine 06/30/2022 0.8  0.5 - 1.4 mg/dL Final    Calcium 06/30/2022 9.5  8.7 - 10.5 mg/dL Final    Anion Gap 06/30/2022 11  8 - 16 mmol/L Final    eGFR if African American 06/30/2022 >60  >60 mL/min/1.73 m^2 Final    eGFR if non African American  06/30/2022 >60  >60 mL/min/1.73 m^2 Final    WBC 06/30/2022 5.23  3.90 - 12.70 K/uL Final    RBC 06/30/2022 5.29  4.00 - 5.40 M/uL Final    Hemoglobin 06/30/2022 16.6 (A) 12.0 - 16.0 g/dL Final    Hematocrit 06/30/2022 47.9  37.0 - 48.5 % Final    MCV 06/30/2022 91  82 - 98 fL Final    MCH 06/30/2022 31.4 (A) 27.0 - 31.0 pg Final    MCHC 06/30/2022 34.7  32.0 - 36.0 g/dL Final    RDW 06/30/2022 11.9  11.5 - 14.5 % Final    Platelets 06/30/2022 251  150 - 450 K/uL Final    MPV 06/30/2022 9.0 (A) 9.2 - 12.9 fL Final    Immature Granulocytes 06/30/2022 0.2  0.0 - 0.5 % Final    Gran # (ANC) 06/30/2022 3.3  1.8 - 7.7 K/uL Final    Immature Grans (Abs) 06/30/2022 0.01  0.00 - 0.04 K/uL Final    Lymph # 06/30/2022 1.4  1.0 - 4.8 K/uL Final    Mono # 06/30/2022 0.4  0.3 - 1.0 K/uL Final    Eos # 06/30/2022 0.1  0.0 - 0.5 K/uL Final    Baso # 06/30/2022 0.08  0.00 - 0.20 K/uL Final    nRBC 06/30/2022 0  0 /100 WBC Final    Gran % 06/30/2022 62.2  38.0 - 73.0 % Final    Lymph % 06/30/2022 26.8  18.0 - 48.0 % Final    Mono % 06/30/2022 7.8  4.0 - 15.0 % Final    Eosinophil % 06/30/2022 1.5  0.0 - 8.0 % Final    Basophil % 06/30/2022 1.5  0.0 - 1.9 % Final    Differential Method 06/30/2022 Automated   Final    Group & Rh 06/30/2022 A NEG   Final    Indirect Adamaris 06/30/2022 NEG   Final    Rh Type 06/30/2022 NEG   Final       Past Medical History:   Diagnosis Date    Asthma     Chronic diarrhea     GERD (gastroesophageal reflux disease)     Irritable bowel syndrome      Past Surgical History:   Procedure Laterality Date    COLONOSCOPY      ROBOT-ASSISTED LAPAROSCOPIC ABDOMINAL HYSTERECTOMY USING DA BALBIR XI N/A 7/1/2022    Procedure: XI ROBOTIC HYSTERECTOMY;  Surgeon: Lashell Mittal MD;  Location: [facility];  Service: OB/GYN;  Laterality: N/A;    ROBOT-ASSISTED LAPAROSCOPIC SALPINGO-OOPHORECTOMY USING DA BALBIR XI Bilateral 7/1/2022    Procedure: XI ROBOTIC SALPINGO-OOPHORECTOMY;  Surgeon:  Lashell Mittal MD;  Location: [facility];  Service: OB/GYN;  Laterality: Bilateral;    TONSILLECTOMY      UPPER GASTROINTESTINAL ENDOSCOPY       Social History     Tobacco Use    Smoking status: Former Smoker     Types: Cigarettes    Smokeless tobacco: Never Used   Substance Use Topics    Alcohol use: Yes     Comment: Rarely now but frequent drinking in college    Drug use: Never     Family History   Problem Relation Age of Onset    Thyroid cancer Father 48        type?    BRCA 1/2 Father         BRCA2+    Other Sister         BRCA-negative    Genetic Disorder Sister         MUTYH mutation 536A>G ([id number]) heterozygous    Colon polyps Sister     Breast cancer Paternal Grandmother         50s? unilat(?)    No Known Problems Daughter     GI problems Son         undiagnosed thus far    Other Paternal Aunt         BRCA-negative    Breast cancer Paternal Uncle 75    BRCA 1/2 Paternal Uncle         BRCA2 [id number] ([id number]), Heterozygous, DELETERIOUS    Celiac disease Other     Cancer Maternal Cousin         throat(?)    Genetic Disorder Paternal Cousin         (BRCA2?)    Esophageal cancer Neg Hx     Colon cancer Neg Hx     Cirrhosis Neg Hx     Crohn's disease Neg Hx     Hemochromatosis Neg Hx     Inflammatory bowel disease Neg Hx     Irritable bowel syndrome Neg Hx     Liver cancer Neg Hx     Liver disease Neg Hx     Rectal cancer Neg Hx     Stomach cancer Neg Hx     Ulcerative colitis Neg Hx     Pancreatic cancer Neg Hx      OB History   No obstetric history on file.       Current Outpatient Medications:     acetaminophen (TYLENOL) 650 MG TbSR, Take 1 tablet (650 mg total) by mouth every 6 to 8 hours as needed (Pain). Alternate every 3 hours with ibuprofen., Disp: 60 tablet, Rfl: 1    albuterol (PROVENTIL/VENTOLIN HFA) 90 mcg/actuation inhaler, ProAir HFA 90 mcg/actuation aerosol inhaler, Disp: , Rfl:     azelastine (ASTELIN) 137 mcg (0.1 %) nasal spray, USE 1 SPRAY  "TWICE A DAY INTO EACH NOSTRIL, Disp: , Rfl:     clotrimazole-betamethasone 1-0.05% (LOTRISONE) cream, clotrimazole-betamethasone 1 %-0.05 % topical cream, Disp: , Rfl:     estradiol 0.05 mg/24 hr td ptsw (VIVELLE-DOT) 0.05 mg/24 hr, Place 1 patch onto the skin twice a week., Disp: 8 patch, Rfl: 11    ibuprofen (ADVIL,MOTRIN) 200 MG tablet, Take 400 mg by mouth every 6 (six) hours as needed for Pain., Disp: , Rfl:     ibuprofen (ADVIL,MOTRIN) 600 MG tablet, Take 1 tablet (600 mg total) by mouth every 6 (six) hours as needed for Pain. Alternate every 3 hours with tylenol, Disp: 60 tablet, Rfl: 1    loratadine (CLARITIN) 10 mg tablet, Claritin, Disp: , Rfl:     oxyCODONE (ROXICODONE) 5 MG immediate release tablet, Take 1 tablet (5 mg total) by mouth every 4 (four) hours as needed for Pain. (Patient not taking: Reported on 8/24/2022), Disp: 15 tablet, Rfl: 0    VYVANSE 20 mg capsule, , Disp: , Rfl:     XIIDRA 5 % Dpet, INSTILL 1 DROP TWICE A DAY INTO BOTH EYES, Disp: , Rfl:     The ASCVD Risk score (Bristol DC Jr., et al., 2013) failed to calculate for the following reasons:    Cannot find a previous HDL lab    Cannot find a previous total cholesterol lab    Review of Systems:  General: No fever, chills, or weight loss.  Chest: No chest pain, shortness of breath, or palpitations.  Breast: No pain, masses, or nipple discharge.  Vulva: No pain, lesions, or itching.  Vagina: No relaxation, itching, discharge, or lesions.  Abdomen: No pain, nausea, vomiting, diarrhea, or constipation.  Urinary: No incontinence, nocturia, frequency, or dysuria.  Extremities:  No leg cramps, edema, or calf pain.  Neurologic: No headaches, dizziness, or visual changes.    Objective:     Vitals:    08/24/22 1315   BP: (!) 112/95   Pulse: 92   Weight: 55.9 kg (123 lb 3.2 oz)   Height: 5' 8" (1.727 m)   PainSc: 0-No pain     Body mass index is 18.73 kg/m².    PHYSICAL EXAM:  APPEARANCE: Well nourished, well developed, in no acute " distress.  AFFECT: WNL, alert and oriented x 3  SKIN: No acne or hirsutism      Assessment:      Menopausal symptoms: Risks and benefits of hormone replacement therapy were discussed. Hormone replacement therapy options, including bioidentical versus non-bioidentical hormones, as well as alternatives discussed. Counseled that HRT with BRCA1 mutation s/p hyst/BSO is not contraindicated. Reviewed health benefits with ERT and that starting HRT will increase TC score. She would like to start ERT. Will start lowest effective dose. Recommend meeting with cardiology given history of gestational DM.  -     estradiol 0.05 mg/24 hr td ptsw (VIVELLE-DOT) 0.05 mg/24 hr; Place 1 patch onto the skin twice a week.  Dispense: 8 patch; Refill: 11  -     Estradiol; Future; Expected date: 08/24/2022  -     Testosterone, free; Future; Expected date: 08/24/2022  -     Ambulatory referral/consult to Cardiology; Future; Expected date: 08/31/2022    Monoallelic mutation of BRCA2 gene  -     Ambulatory referral/consult to [facility]    History of gestational diabetes  -     Ambulatory referral/consult to Cardiology; Future; Expected date: 08/31/2022       Plan:   Start vivelle-dot 0.05mg BIW.  Reviewed common side effects.  Referral to cardiology given history of gestational DM and early surgical menopause.  Reviewed benefits of acupuncture for menopausal symptoms and back pain.  She is interested in acupuncture but too far to do at cancer center- will look into options closer to home.  Follow up with Dr. Hamilton with breast surgery and Genetics as scheduled.  Follow up with Dr. Mae in 3 months with labs 1 week prior.     Instructed patient to call if she experiences any side effects or has any questions.    I spent a total of 40 minutes on the day of the visit.This includes face to face time and non-face to face time preparing to see the patient (eg, review of tests), obtaining and/or reviewing separately obtained  history, documenting clinical information in the electronic or other health record, independently interpreting results and communicating results to the patient/family/caregiver, or care coordinator.    A full discussion of the benefit-risk ratio of hormonal replacement therapy was carried out. Improvement in vasomotor and other climacteric symptoms is discussed, including possible improvements in sleep and mood. Reduction of risk for osteoporosis was explained. We discussed the study data showing increased risk of thrombo-embolic events such as myocardial infarction, stroke and also possibly breast cancer with estrogen replacement, and how this might affect her. The range of side effects such as breast tenderness, weight gain and including possible increases in lifetime risk of breast cancer and possible thrombotic complications was discussed. We also discussed ACOG's recommendation to use hormone replacement therapy for the relief of hot flashes alone and to be on the lowest dose possible for the shortest amount of time.  Alternative such as herbal and soy-based products were reviewed. All of her questions about this therapy were answered.

## 2022-09-14 ENCOUNTER — OFFICE VISIT (OUTPATIENT)
Dept: GYNECOLOGIC ONCOLOGY | Facility: CLINIC | Age: 47
End: 2022-09-14
Payer: COMMERCIAL

## 2022-09-14 VITALS
BODY MASS INDEX: 18.19 KG/M2 | HEIGHT: 69 IN | RESPIRATION RATE: 16 BRPM | DIASTOLIC BLOOD PRESSURE: 71 MMHG | WEIGHT: 122.81 LBS | SYSTOLIC BLOOD PRESSURE: 110 MMHG | HEART RATE: 86 BPM

## 2022-09-14 DIAGNOSIS — Z15.09 MONOALLELIC MUTATION OF BRCA2 GENE: Primary | ICD-10-CM

## 2022-09-14 DIAGNOSIS — R19.00 PELVIC MASS: ICD-10-CM

## 2022-09-14 DIAGNOSIS — Z15.01 MONOALLELIC MUTATION OF BRCA2 GENE: Primary | ICD-10-CM

## 2022-09-14 PROCEDURE — 3074F SYST BP LT 130 MM HG: CPT | Mod: CPTII,S$GLB,, | Performed by: OBSTETRICS & GYNECOLOGY

## 2022-09-14 PROCEDURE — 3074F PR MOST RECENT SYSTOLIC BLOOD PRESSURE < 130 MM HG: ICD-10-PCS | Mod: CPTII,S$GLB,, | Performed by: OBSTETRICS & GYNECOLOGY

## 2022-09-14 PROCEDURE — 3008F BODY MASS INDEX DOCD: CPT | Mod: CPTII,S$GLB,, | Performed by: OBSTETRICS & GYNECOLOGY

## 2022-09-14 PROCEDURE — 3008F PR BODY MASS INDEX (BMI) DOCUMENTED: ICD-10-PCS | Mod: CPTII,S$GLB,, | Performed by: OBSTETRICS & GYNECOLOGY

## 2022-09-14 PROCEDURE — 99024 PR POST-OP FOLLOW-UP VISIT: ICD-10-PCS | Mod: S$GLB,,, | Performed by: OBSTETRICS & GYNECOLOGY

## 2022-09-14 PROCEDURE — 3078F PR MOST RECENT DIASTOLIC BLOOD PRESSURE < 80 MM HG: ICD-10-PCS | Mod: CPTII,S$GLB,, | Performed by: OBSTETRICS & GYNECOLOGY

## 2022-09-14 PROCEDURE — 1159F MED LIST DOCD IN RCRD: CPT | Mod: CPTII,S$GLB,, | Performed by: OBSTETRICS & GYNECOLOGY

## 2022-09-14 PROCEDURE — 1159F PR MEDICATION LIST DOCUMENTED IN MEDICAL RECORD: ICD-10-PCS | Mod: CPTII,S$GLB,, | Performed by: OBSTETRICS & GYNECOLOGY

## 2022-09-14 PROCEDURE — 3078F DIAST BP <80 MM HG: CPT | Mod: CPTII,S$GLB,, | Performed by: OBSTETRICS & GYNECOLOGY

## 2022-09-14 PROCEDURE — 99024 POSTOP FOLLOW-UP VISIT: CPT | Mod: S$GLB,,, | Performed by: OBSTETRICS & GYNECOLOGY

## 2022-09-14 PROCEDURE — 99999 PR PBB SHADOW E&M-EST. PATIENT-LVL III: CPT | Mod: PBBFAC,,, | Performed by: OBSTETRICS & GYNECOLOGY

## 2022-09-14 PROCEDURE — 99999 PR PBB SHADOW E&M-EST. PATIENT-LVL III: ICD-10-PCS | Mod: PBBFAC,,, | Performed by: OBSTETRICS & GYNECOLOGY

## 2022-09-14 RX ORDER — MONTELUKAST SODIUM 10 MG/1
10 TABLET ORAL NIGHTLY
COMMUNITY

## 2022-09-21 NOTE — PROGRESS NOTES
Subjective:      Patient ID: Linda Acosta is a 47 y.o. female.    Chief Complaint: Monoallelic mutation of BRCA2 gene      HPI  s/p risk reducing RTLH/BSO 7/1/2022  Final pathology reviewed and benign. Negative cytologic washings. And benign ovarian teratoma.     Presents today for follow up post operative visit.   Working with survivorship team for HRT.   _____________  Referral history:  Referred by Marcin Soto. Patient presents to clinic for evaluation of a BRCA 2 mutation. Patient has an appointment with the high risk breast center and Dr. Chisholm in 6/2022.     Patient's father, paternal uncle, and cousin have also tested positive for BRCA 2. She denies family history of ovarian cancer.      Prior abdominal surgeries include c/s x 2.      Primary ob/gyn is Dr. Glenn Marsh.   Review of Systems   Constitutional:  Negative for appetite change, chills, fatigue and fever.   HENT:  Negative for mouth sores.    Respiratory:  Negative for cough and shortness of breath.    Cardiovascular:  Negative for leg swelling.   Gastrointestinal:  Negative for abdominal pain, blood in stool, constipation and diarrhea.   Endocrine: Negative for cold intolerance.   Genitourinary:  Negative for dysuria and vaginal bleeding.   Musculoskeletal:  Negative for myalgias.   Skin:  Negative for rash.   Allergic/Immunologic: Negative.    Neurological:  Negative for weakness and numbness.   Hematological:  Negative for adenopathy. Does not bruise/bleed easily.   Psychiatric/Behavioral:  Negative for confusion.      Objective:   Physical Exam:   Constitutional: She is oriented to person, place, and time. She appears well-developed and well-nourished.    HENT:   Head: Normocephalic and atraumatic.    Eyes: Pupils are equal, round, and reactive to light. EOM are normal.    Neck: No thyromegaly present.    Cardiovascular:  Normal rate, regular rhythm and intact distal pulses.             Pulmonary/Chest: Effort normal and breath sounds  normal. No respiratory distress. She has no wheezes.        Abdominal: Soft. Bowel sounds are normal. She exhibits abdominal incision. She exhibits no distension and no mass. There is no abdominal tenderness.     Genitourinary:    Vagina and rectum normal.      Pelvic exam was performed with patient supine.   There is no lesion on the right labia. There is no lesion on the left labia. Right adnexum displays no mass. Left adnexum displays no mass. Vaginal cuff normal.  Cervix is absent.Uterus is absent.    Genitourinary Comments: Vaginal cuff healing well.              Musculoskeletal: Normal range of motion and moves all extremeties.      Lymphadenopathy:     She has no cervical adenopathy. No inguinal adenopathy noted on the right or left side.        Right: No supraclavicular adenopathy present.        Left: No supraclavicular adenopathy present.    Neurological: She is alert and oriented to person, place, and time.    Skin: Skin is warm and dry. No rash noted.    Psychiatric: She has a normal mood and affect.     Assessment:     1. Monoallelic mutation of BRCA2 gene    2. Pelvic mass        Plan:   No orders of the defined types were placed in this encounter.    S/p risk reducing surgery with benign pathology.  Recovering appropriately from surgery.     Data suggests that women who undergo rrBSO remain at risk for developing primary peritoneal cancer, which is much less common than ovarian cancer but associated with high mortality, similar to that of stage III epithelial ovarian cancer. A review of five studies including 846 patients with BRCA1/2 mutations concluded the risk of peritoneal cancer after rrBSO was 1.7 percent (range, 0.5 to 10.7 percent). Subsequently, a large prospective study including 1045 patients estimated a 4.3 percent cumulative incidence of peritoneal carcinoma in BRCA1/2 mutation carriers at 20 years after rrBSO. The risk appears to be highest in BRCA1 carriers, although an association with  BRCA2 cannot be excluded; peritoneal carcinomas have been reported in a few women with BRCA2 mutations     Surveillance for peritoneal cancer -- Patients who have undergone rrBSO are at risk of developing serous carcinoma of the peritoneum (incidence 1.7 percent in one prospective study). There are no high-quality data regarding the optimal methods of surveillance for peritoneal cancer following rrBSO. In our practice, we follow patients with an annual pelvic examination and serum . The interpretation and diagnostic performance of the  test is limited and we reviewed these limitations specifically.    RTC 1 year or sooner if needed.

## 2022-09-25 ENCOUNTER — PATIENT MESSAGE (OUTPATIENT)
Dept: GYNECOLOGIC ONCOLOGY | Facility: CLINIC | Age: 47
End: 2022-09-25
Payer: COMMERCIAL

## 2022-09-28 ENCOUNTER — TELEPHONE (OUTPATIENT)
Dept: GYNECOLOGIC ONCOLOGY | Facility: CLINIC | Age: 47
End: 2022-09-28
Payer: COMMERCIAL

## 2022-09-28 NOTE — TELEPHONE ENCOUNTER
Spoke with patient. Reviewed concerns and expectations for healing. Continue pelvic rest at this time. Explained precautions. Okay to start resuming other exercise to increase core and pelvic floor musculature. I would like to see her back as well for follow up.     She voiced understanding.

## 2022-10-05 ENCOUNTER — PATIENT MESSAGE (OUTPATIENT)
Dept: GYNECOLOGIC ONCOLOGY | Facility: CLINIC | Age: 47
End: 2022-10-05
Payer: COMMERCIAL

## 2022-10-05 DIAGNOSIS — G89.18 POST-OP PAIN: Primary | ICD-10-CM

## 2022-10-12 ENCOUNTER — HOSPITAL ENCOUNTER (OUTPATIENT)
Dept: RADIOLOGY | Facility: HOSPITAL | Age: 47
Discharge: HOME | End: 2022-10-12
Attending: NURSE PRACTITIONER
Payer: COMMERCIAL

## 2022-10-12 DIAGNOSIS — Z15.01 MONOALLELIC MUTATION OF BRCA2 GENE: ICD-10-CM

## 2022-10-12 DIAGNOSIS — Z91.89 AT HIGH RISK FOR BREAST CANCER: ICD-10-CM

## 2022-10-12 DIAGNOSIS — Z15.09 MONOALLELIC MUTATION OF BRCA2 GENE: ICD-10-CM

## 2022-10-12 PROCEDURE — 77049 MRI BREAST C-+ W/CAD BI: CPT | Mod: 26,,, | Performed by: RADIOLOGY

## 2022-10-12 PROCEDURE — 25500020 PHARM REV CODE 255: Performed by: NURSE PRACTITIONER

## 2022-10-12 PROCEDURE — 77049 MRI BREAST C-+ W/CAD BI: CPT | Mod: TC

## 2022-10-12 PROCEDURE — 77049 MRI BREAST W/WO CONTRAST, W/CAD, BILATERAL: ICD-10-PCS | Mod: 26,,, | Performed by: RADIOLOGY

## 2022-10-12 PROCEDURE — A9577 INJ MULTIHANCE: HCPCS | Performed by: NURSE PRACTITIONER

## 2022-10-12 RX ADMIN — GADOBENATE DIMEGLUMINE 12 ML: 529 INJECTION, SOLUTION INTRAVENOUS at 12:10

## 2022-10-13 ENCOUNTER — TELEPHONE (OUTPATIENT)
Dept: RADIOLOGY | Facility: HOSPITAL | Age: 47
End: 2022-10-13
Payer: COMMERCIAL

## 2022-10-13 NOTE — TELEPHONE ENCOUNTER
Spoke with patient. Reviewed MRI breast biopsy procedure and reviewed instructions for MRI breast biopsy. Patient expressed understanding and all questions were answered. Provided patient with my phone number to call for any further concerns or questions.   Patient scheduled MRI breast biopsy for 11/8/2022.

## 2022-11-02 LAB
ESTRADIOL SERPL-MCNC: 19 PG/ML
TESTOST FREE SERPL-MCNC: 0.6 PG/ML (ref 0.2–5)

## 2022-11-03 ENCOUNTER — OFFICE VISIT (OUTPATIENT)
Dept: OBSTETRICS AND GYNECOLOGY | Facility: CLINIC | Age: 47
End: 2022-11-03
Attending: OBSTETRICS & GYNECOLOGY
Payer: COMMERCIAL

## 2022-11-03 VITALS
WEIGHT: 122 LBS | BODY MASS INDEX: 18.49 KG/M2 | SYSTOLIC BLOOD PRESSURE: 103 MMHG | HEIGHT: 68 IN | HEART RATE: 93 BPM | DIASTOLIC BLOOD PRESSURE: 70 MMHG

## 2022-11-03 DIAGNOSIS — Z79.890 SURGICAL MENOPAUSE ON HORMONE REPLACEMENT THERAPY: ICD-10-CM

## 2022-11-03 DIAGNOSIS — Z15.09 MONOALLELIC MUTATION OF BRCA2 GENE: Primary | ICD-10-CM

## 2022-11-03 DIAGNOSIS — Z15.01 MONOALLELIC MUTATION OF BRCA2 GENE: Primary | ICD-10-CM

## 2022-11-03 DIAGNOSIS — N94.19 DYSPAREUNIA DUE TO MEDICAL CONDITION IN FEMALE: ICD-10-CM

## 2022-11-03 DIAGNOSIS — Z91.89 AT HIGH RISK FOR BREAST CANCER: ICD-10-CM

## 2022-11-03 DIAGNOSIS — E89.40 SURGICAL MENOPAUSE ON HORMONE REPLACEMENT THERAPY: ICD-10-CM

## 2022-11-03 DIAGNOSIS — N95.2 VAGINAL ATROPHY: ICD-10-CM

## 2022-11-03 PROCEDURE — 3074F PR MOST RECENT SYSTOLIC BLOOD PRESSURE < 130 MM HG: ICD-10-PCS | Mod: CPTII,S$GLB,, | Performed by: OBSTETRICS & GYNECOLOGY

## 2022-11-03 PROCEDURE — 99214 PR OFFICE/OUTPT VISIT, EST, LEVL IV, 30-39 MIN: ICD-10-PCS | Mod: S$GLB,,, | Performed by: OBSTETRICS & GYNECOLOGY

## 2022-11-03 PROCEDURE — 3078F DIAST BP <80 MM HG: CPT | Mod: CPTII,S$GLB,, | Performed by: OBSTETRICS & GYNECOLOGY

## 2022-11-03 PROCEDURE — 99999 PR PBB SHADOW E&M-EST. PATIENT-LVL III: CPT | Mod: PBBFAC,,, | Performed by: OBSTETRICS & GYNECOLOGY

## 2022-11-03 PROCEDURE — 3008F PR BODY MASS INDEX (BMI) DOCUMENTED: ICD-10-PCS | Mod: CPTII,S$GLB,, | Performed by: OBSTETRICS & GYNECOLOGY

## 2022-11-03 PROCEDURE — 3078F PR MOST RECENT DIASTOLIC BLOOD PRESSURE < 80 MM HG: ICD-10-PCS | Mod: CPTII,S$GLB,, | Performed by: OBSTETRICS & GYNECOLOGY

## 2022-11-03 PROCEDURE — 99214 OFFICE O/P EST MOD 30 MIN: CPT | Mod: S$GLB,,, | Performed by: OBSTETRICS & GYNECOLOGY

## 2022-11-03 PROCEDURE — 1159F MED LIST DOCD IN RCRD: CPT | Mod: CPTII,S$GLB,, | Performed by: OBSTETRICS & GYNECOLOGY

## 2022-11-03 PROCEDURE — 3008F BODY MASS INDEX DOCD: CPT | Mod: CPTII,S$GLB,, | Performed by: OBSTETRICS & GYNECOLOGY

## 2022-11-03 PROCEDURE — 1159F PR MEDICATION LIST DOCUMENTED IN MEDICAL RECORD: ICD-10-PCS | Mod: CPTII,S$GLB,, | Performed by: OBSTETRICS & GYNECOLOGY

## 2022-11-03 PROCEDURE — 3074F SYST BP LT 130 MM HG: CPT | Mod: CPTII,S$GLB,, | Performed by: OBSTETRICS & GYNECOLOGY

## 2022-11-03 PROCEDURE — 99999 PR PBB SHADOW E&M-EST. PATIENT-LVL III: ICD-10-PCS | Mod: PBBFAC,,, | Performed by: OBSTETRICS & GYNECOLOGY

## 2022-11-03 RX ORDER — LISDEXAMFETAMINE DIMESYLATE 30 MG/1
CAPSULE ORAL
COMMUNITY

## 2022-11-03 RX ORDER — ESTRADIOL 0.1 MG/D
1 FILM, EXTENDED RELEASE TRANSDERMAL
Qty: 8 PATCH | Refills: 11 | Status: SHIPPED | OUTPATIENT
Start: 2022-11-03 | End: 2023-10-08

## 2022-11-03 RX ORDER — OMEPRAZOLE 40 MG/1
CAPSULE, DELAYED RELEASE ORAL
COMMUNITY
End: 2023-05-17

## 2022-11-03 RX ORDER — PRASTERONE 6.5 MG/1
6.5 INSERT VAGINAL NIGHTLY
Qty: 30 EACH | Refills: 11 | Status: SHIPPED | OUTPATIENT
Start: 2022-11-03

## 2022-11-03 NOTE — PROGRESS NOTES
"SUBJECTIVE:   47 y.o. female   presents today for follow-up. Patient's last menstrual period was 2022 (exact date)..  She had hysterectomy and BSO on 2020 and to with Dr. Mittal secondary to BRCA2 mutation.  She was started on Vivelle-Dot at the end of August and reports that she physically feels better but I am head case.  She reports hot flashes and night sweats have resolved but she has extreme mood swings.  She also reports some obsessive thoughts.  That are "intrusive" she has seen her psychiatrist and a psychiatrist recommended that she 1st discuss with me her hormone replacement therapy  She does have vaginal dryness.  And tried to have intercourse 1 time she reports.  That she had bleeding and it was painful she has been seen twice by her gyn locally and was treated with silver nitrate because granulation tissue was seen on the cuff.    She has not had intercourse again because of this    Past Medical History:   Diagnosis Date    Abnormal Pap smear of cervix     Asthma     Chronic diarrhea     GERD (gastroesophageal reflux disease)     Irritable bowel syndrome      Past Surgical History:   Procedure Laterality Date     SECTION      COLONOSCOPY      COLPOSCOPY      HYSTERECTOMY      OOPHORECTOMY      ROBOT-ASSISTED LAPAROSCOPIC ABDOMINAL HYSTERECTOMY USING DA BALBIR XI N/A 2022    Procedure: XI ROBOTIC HYSTERECTOMY;  Surgeon: Lashell Mittal MD;  Location: [facility];  Service: OB/GYN;  Laterality: N/A;    ROBOT-ASSISTED LAPAROSCOPIC SALPINGO-OOPHORECTOMY USING DA BALBIR XI Bilateral 2022    Procedure: XI ROBOTIC SALPINGO-OOPHORECTOMY;  Surgeon: Lashell Mittal MD;  Location: [facility];  Service: OB/GYN;  Laterality: Bilateral;    TONSILLECTOMY      UPPER GASTROINTESTINAL ENDOSCOPY       Social History     Socioeconomic History    Marital status:    Tobacco Use    Smoking status: Former     Types: Cigarettes    Smokeless tobacco: Former   Substance and " Sexual Activity    Alcohol use: Yes     Comment: Rarely now but frequent drinking in college    Drug use: Never    Sexual activity: Yes     Partners: Male   Social History Narrative    She was  once for 6 years .    She is currently single but engaged to be .    She has 2 healthy children born in  and .    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     Family History   Problem Relation Age of Onset    Thyroid cancer Father 48        type?    BRCA 1/2 Father         BRCA2+    Other Sister         BRCA-negative    Genetic Disorder Sister         MUTYH mutation 536A>G ([id number]) heterozygous    Colon polyps Sister     Breast cancer Paternal Grandmother         50s? unilat(?)    No Known Problems Daughter     GI problems Son         undiagnosed thus far    Other Paternal Aunt         BRCA-negative    Breast cancer Paternal Uncle 75    BRCA 1/2 Paternal Uncle         BRCA2 c.[id number] ([id number]), Heterozygous, DELETERIOUS    Celiac disease Other     Cancer Maternal Cousin         throat(?)    Genetic Disorder Paternal Cousin         (BRCA2?)    Esophageal cancer Neg Hx     Colon cancer Neg Hx     Cirrhosis Neg Hx     Crohn's disease Neg Hx     Hemochromatosis Neg Hx     Inflammatory bowel disease Neg Hx     Irritable bowel syndrome Neg Hx     Liver cancer Neg Hx     Liver disease Neg Hx     Rectal cancer Neg Hx     Stomach cancer Neg Hx     Ulcerative colitis Neg Hx     Pancreatic cancer Neg Hx      OB History    Para Term  AB Living   2 2           SAB IAB Ectopic Multiple Live Births                  # Outcome Date GA Lbr [place]/2nd Weight Sex Delivery Anes PTL Lv   2 Para      CS-LTranv      1 Para      CS-LTranv              Current Outpatient Medications   Medication Sig Dispense Refill    albuterol (PROVENTIL/VENTOLIN HFA) 90 mcg/actuation inhaler ProAir HFA 90 mcg/actuation aerosol inhaler      azelastine (ASTELIN) 137 mcg  (0.1 %) nasal spray USE 1 SPRAY TWICE A DAY INTO EACH NOSTRIL      estradioL (VIVELLE-DOT) 0.1 mg/24 hr PTSW Place 1 patch onto the skin twice a week. 8 patch 11    estradiol 0.05 mg/24 hr td ptsw (VIVELLE-DOT) 0.05 mg/24 hr Place 1 patch onto the skin twice a week. 8 patch 11    lisdexamfetamine (VYVANSE) 30 MG capsule Vyvanse 30 mg capsule      loratadine (CLARITIN) 10 mg tablet Claritin      montelukast (SINGULAIR) 10 mg tablet Take 10 mg by mouth every evening.      omeprazole (PRILOSEC) 40 MG capsule omeprazole 40 mg capsule,delayed release   TAKE 1 CAPSULE BY MOUTH EVERY DAY      prasterone, dhea, (INTRAROSA) 6.5 mg Inst Place 6.5 mg vaginally every evening. 30 each 11    XIIDRA 5 % Dpet INSTILL 1 DROP TWICE A DAY INTO BOTH EYES       No current facility-administered medications for this visit.     Allergies: Patient has no known allergies.     The ASCVD Risk score (Thomas DK, et al., 2019) failed to calculate for the following reasons:    Cannot find a previous HDL lab    Cannot find a previous total cholesterol lab      ROS:  Constitutional: no weight loss, weight gain, fever, fatigue  Eyes:  No vision changes, glasses/contacts  ENT/Mouth: No ulcers, sinus problems, ears ringing, headache  Cardiovascular: No inability to lie flat, chest pain, exercise intolerance, swelling, heart palpitations  Respiratory: No wheezing, coughing blood, shortness of breath, or cough  Gastrointestinal: No diarrhea, bloody stool, nausea/vomiting, constipation, gas, hemorrhoids  Genitourinary: No blood in urine, painful urination, urgency of urination, frequency of urination, incomplete emptying, incontinence, abnormal bleeding, painful periods, heavy periods, vaginal discharge, vaginal odor, painful intercourse, sexual problems, bleeding after intercourse.  Musculoskeletal: No muscle weakness  Skin/Breast: No painful breasts, nipple discharge, masses, rash, ulcers  Neurological: No passing out, seizures, numbness,  headache  Endocrine: No diabetes, hypothyroid, hyperthyroid, hot flashes, hair loss, abnormal hair growth, acne  Psychiatric: No depression, crying  Hematologic: No bruises, bleeding, swollen lymph nodes, anemia.      Physical Exam  General- well developed, well nourished  Vulva- no masses, no lesions  Vagina-  no masses, no lesions  Cervix- absent surgically, +cuff intact and healing- no evidence of granulation tissue  Uterus- absent surgically  Adnexa- nontender, no masses    Labs 10/31/2022  Estradiol 19  Free Testosterone 0.6    ASSESSMENT:   1. Monoallelic mutation of BRCA2 gene        2. At high risk for breast cancer        3. Surgical menopause on hormone replacement therapy        4. Vaginal atrophy  5. Dyspareunia      PLAN:  Total time 25 minutes-, face-to-face review of medical record,, review of labs and arranging follow-up  Counseled her that I would recommend increasing dose of Vivelle-Dot to 0.1 mg twice weekly.  If no improvement in mood swings would recommend follow-up with her psychiatrist  Counseled her on safety and efficacy of intravaginal DHEA  Recommend pelvic floor physical therapy

## 2022-11-04 ENCOUNTER — PATIENT MESSAGE (OUTPATIENT)
Dept: SURGERY | Facility: CLINIC | Age: 47
End: 2022-11-04
Payer: COMMERCIAL

## 2022-11-22 ENCOUNTER — HOSPITAL ENCOUNTER (OUTPATIENT)
Dept: RADIOLOGY | Facility: HOSPITAL | Age: 47
Discharge: HOME | End: 2022-11-22
Attending: NURSE PRACTITIONER
Payer: COMMERCIAL

## 2022-11-22 DIAGNOSIS — R92.8 ABNORMAL FINDING ON BREAST IMAGING: ICD-10-CM

## 2022-11-22 PROCEDURE — 88341 PR IHC OR ICC EACH ADD'L SINGLE ANTIBODY  STAINPR: ICD-10-PCS | Mod: 26,59,, | Performed by: PATHOLOGY

## 2022-11-22 PROCEDURE — 77065 MAMMO DIGITAL DIAGNOSTIC RIGHT: ICD-10-PCS | Mod: 26,RT,, | Performed by: RADIOLOGY

## 2022-11-22 PROCEDURE — 88360 PR  TUMOR IMMUNOHISTOCHEM/MANUAL: ICD-10-PCS | Mod: 26,,, | Performed by: PATHOLOGY

## 2022-11-22 PROCEDURE — 88342 IMHCHEM/IMCYTCHM 1ST ANTB: CPT | Mod: 26,59,, | Performed by: PATHOLOGY

## 2022-11-22 PROCEDURE — 88305 TISSUE EXAM BY PATHOLOGIST: ICD-10-PCS | Mod: 26,,, | Performed by: PATHOLOGY

## 2022-11-22 PROCEDURE — 25000003 PHARM REV CODE 250: Performed by: NURSE PRACTITIONER

## 2022-11-22 PROCEDURE — 88360 TUMOR IMMUNOHISTOCHEM/MANUAL: CPT | Performed by: PATHOLOGY

## 2022-11-22 PROCEDURE — 88305 TISSUE EXAM BY PATHOLOGIST: CPT | Performed by: PATHOLOGY

## 2022-11-22 PROCEDURE — 88360 TUMOR IMMUNOHISTOCHEM/MANUAL: CPT | Mod: 26,,, | Performed by: PATHOLOGY

## 2022-11-22 PROCEDURE — A9577 INJ MULTIHANCE: HCPCS | Performed by: NURSE PRACTITIONER

## 2022-11-22 PROCEDURE — 77065 DX MAMMO INCL CAD UNI: CPT | Mod: 26,RT,, | Performed by: RADIOLOGY

## 2022-11-22 PROCEDURE — 19085 BX BREAST 1ST LESION MR IMAG: CPT | Mod: ,,, | Performed by: RADIOLOGY

## 2022-11-22 PROCEDURE — 25500020 PHARM REV CODE 255: Performed by: NURSE PRACTITIONER

## 2022-11-22 PROCEDURE — 88341 IMHCHEM/IMCYTCHM EA ADD ANTB: CPT | Mod: 26,59,, | Performed by: PATHOLOGY

## 2022-11-22 PROCEDURE — 88305 TISSUE EXAM BY PATHOLOGIST: CPT | Mod: 26,,, | Performed by: PATHOLOGY

## 2022-11-22 PROCEDURE — 19085 MRI BREAST BIOPSY WITH IMAGING 1ST SITE: ICD-10-PCS | Mod: ,,, | Performed by: RADIOLOGY

## 2022-11-22 PROCEDURE — 88341 IMHCHEM/IMCYTCHM EA ADD ANTB: CPT | Performed by: PATHOLOGY

## 2022-11-22 PROCEDURE — 77065 DX MAMMO INCL CAD UNI: CPT | Mod: TC,RT

## 2022-11-22 PROCEDURE — 88342 CHG IMMUNOCYTOCHEMISTRY: ICD-10-PCS | Mod: 26,59,, | Performed by: PATHOLOGY

## 2022-11-22 PROCEDURE — 27200939 MRI BREAST BIOPSY WITH IMAGING 1ST SITE

## 2022-11-22 PROCEDURE — 88342 IMHCHEM/IMCYTCHM 1ST ANTB: CPT | Performed by: PATHOLOGY

## 2022-11-22 RX ORDER — BUPIVACAINE HYDROCHLORIDE 2.5 MG/ML
20 INJECTION, SOLUTION EPIDURAL; INFILTRATION; INTRACAUDAL ONCE
Status: COMPLETED | OUTPATIENT
Start: 2022-11-22 | End: 2022-11-22

## 2022-11-22 RX ORDER — LIDOCAINE HYDROCHLORIDE 10 MG/ML
3 INJECTION, SOLUTION EPIDURAL; INFILTRATION; INTRACAUDAL; PERINEURAL ONCE
Status: COMPLETED | OUTPATIENT
Start: 2022-11-22 | End: 2022-11-22

## 2022-11-22 RX ADMIN — BUPIVACAINE HYDROCHLORIDE 12 ML: 2.5 INJECTION, SOLUTION EPIDURAL; INFILTRATION; INTRACAUDAL; PERINEURAL at 09:11

## 2022-11-22 RX ADMIN — LIDOCAINE HYDROCHLORIDE 3 ML: 10 INJECTION, SOLUTION EPIDURAL; INFILTRATION; INTRACAUDAL; PERINEURAL at 08:11

## 2022-11-22 RX ADMIN — GADOBENATE DIMEGLUMINE 12 ML: 529 INJECTION, SOLUTION INTRAVENOUS at 09:11

## 2022-11-29 LAB
FINAL PATHOLOGIC DIAGNOSIS: NORMAL
GROSS: NORMAL
Lab: NORMAL
MICROSCOPIC EXAM: NORMAL

## 2022-12-06 ENCOUNTER — TELEPHONE (OUTPATIENT)
Dept: SURGERY | Facility: CLINIC | Age: 47
End: 2022-12-06
Payer: COMMERCIAL

## 2022-12-06 NOTE — TELEPHONE ENCOUNTER
Patient called with results of breast biopsy from 11/22/2022,   no atypia/benign, all questions answered, pt advised to follow up in 6 months with repeat imaging per core biopsy protocol.  reviewed biopsy results and results are benign and concordant. Patient verbalized understanding.     ----- Message from Jia Harrison MD sent at 12/6/2022  9:39 AM CST -----  Benign and concordant.

## 2022-12-09 ENCOUNTER — CLINICAL SUPPORT (OUTPATIENT)
Dept: REHABILITATION | Facility: HOSPITAL | Age: 47
End: 2022-12-09
Payer: COMMERCIAL

## 2022-12-09 DIAGNOSIS — M62.89 HIGH-TONE PELVIC FLOOR DYSFUNCTION: ICD-10-CM

## 2022-12-09 DIAGNOSIS — Z90.721 S/P HYSTERECTOMY WITH OOPHORECTOMY: ICD-10-CM

## 2022-12-09 DIAGNOSIS — Z90.710 S/P HYSTERECTOMY WITH OOPHORECTOMY: ICD-10-CM

## 2022-12-09 DIAGNOSIS — M62.89 PELVIC FLOOR TENSION: ICD-10-CM

## 2022-12-09 DIAGNOSIS — M25.551 RIGHT HIP PAIN: ICD-10-CM

## 2022-12-09 DIAGNOSIS — M54.31 SCIATIC PAIN, RIGHT: ICD-10-CM

## 2022-12-09 PROCEDURE — 97110 THERAPEUTIC EXERCISES: CPT | Mod: PN

## 2022-12-09 PROCEDURE — 97162 PT EVAL MOD COMPLEX 30 MIN: CPT | Mod: PN

## 2022-12-09 NOTE — PATIENT INSTRUCTIONS
Home Exercise Program: 12/09/2022    DIAPHRAGMATIC BREATHING     The diaphragm is a dome shaped muscle that forms the floor of the rib cage. It is the most efficient muscle for breathing and relaxation, although most people are not used to using the diaphragm. Diaphragmatic or belly breathing is an important technique to learn because it helps settle down or relax the autonomic nervous system. The correct use of diaphragmatic breathing can help to quiet brain activity resulting in the relaxation of all the muscles and organs of the body. This is accomplished by slow rhythmic breathing concentrated in the diaphragm muscle rather than the chest.    How to do proper relaxation breathing:    Start by lying on your back or reclining in a chair in a relaxed position. Place one hand on your chest and the other on your abdomen.  Relax your jaw by placing your tongue on the floor of your mouth and keeping your teeth slightly apart.   Take a deep breath in, letting the abdomen expand and rise while you keep your upper chest, neck and shoulders relaxed.   As you breathe out, allow your abdomen and chest to fall. Exhale completely.  It doesn't matter if you breathe in/out through your nose and/or mouth. Do whichever feels comfortable.  Remember to breathe slowly.  Do not force your breathing. Do not hold your breath.  Repeat for 5 minutes every day.        Home Program: 12/09/2022    YOGA POSES    These poses can be used to help improve pelvic floor muscle Drop/Relaxation.    Maintain each position for 1 minute, 1-2 times per day.       Deep Squat    Happy Baby            Child's Pose

## 2022-12-14 PROBLEM — Z90.721 S/P HYSTERECTOMY WITH OOPHORECTOMY: Status: ACTIVE | Noted: 2022-12-14

## 2022-12-14 PROBLEM — M25.551 RIGHT HIP PAIN: Status: ACTIVE | Noted: 2022-12-14

## 2022-12-14 PROBLEM — M62.89 PELVIC FLOOR TENSION: Status: ACTIVE | Noted: 2022-12-14

## 2022-12-14 PROBLEM — M54.31 SCIATIC PAIN, RIGHT: Status: ACTIVE | Noted: 2022-12-14

## 2022-12-14 PROBLEM — Z90.710 S/P HYSTERECTOMY WITH OOPHORECTOMY: Status: ACTIVE | Noted: 2022-12-14

## 2022-12-14 PROBLEM — M62.89 HIGH-TONE PELVIC FLOOR DYSFUNCTION: Status: ACTIVE | Noted: 2022-12-14

## 2022-12-14 NOTE — PLAN OF CARE
[facility]   Physical Therapy Initial Evaluation     Date: 2022   Name: Linda Acosta  Clinic Number: [id number]    Therapy Diagnosis:   Encounter Diagnoses   Name Primary?    Right hip pain     Sciatic pain, right     S/P hysterectomy with oophorectomy     Pelvic floor tension     High-tone pelvic floor dysfunction      Physician: Heydi Walker*    Physician Orders: PT Eval and Treat Pelvic Health  Medical Diagnosis from Referral: Post-op pain [G89.18]  Evaluation Date: 2022  Authorization Period Expiration: 2022  Plan of Care Expiration: 2023  Progress Note Due: 2023  Visit # / Visits authorized:    FOTO: 1/3    Precautions: Standard     Time In: 1:00  Time Out: 1:45  Total Appointment Time (timed & untimed codes): 45 minutes      HISTORY      Linda is a 47 y.o. female evaluated on 2022    Physician:  Heydi Walker*   Diagnosis:   Encounter Diagnoses   Name Primary?    Right hip pain     Sciatic pain, right     S/P hysterectomy with oophorectomy     Pelvic floor tension     High-tone pelvic floor dysfunction       Treatment ordered: Physical Therapy  Medical History:   Past Medical History:   Diagnosis Date    Abnormal Pap smear of cervix     Asthma     Chronic diarrhea     GERD (gastroesophageal reflux disease)     Irritable bowel syndrome       Surgical History:   Past Surgical History:   Procedure Laterality Date     SECTION      COLONOSCOPY      COLPOSCOPY      HYSTERECTOMY      OOPHORECTOMY      ROBOT-ASSISTED LAPAROSCOPIC ABDOMINAL HYSTERECTOMY USING DA BALBIR XI N/A 2022    Procedure: XI ROBOTIC HYSTERECTOMY;  Surgeon: Lashell Mittal MD;  Location: [facility];  Service: OB/GYN;  Laterality: N/A;    ROBOT-ASSISTED LAPAROSCOPIC SALPINGO-OOPHORECTOMY USING DA BALBIR XI Bilateral 2022    Procedure: XI ROBOTIC SALPINGO-OOPHORECTOMY;  Surgeon: Lashell Mittal MD;  Location: [facility];  Service: OB/GYN;   Laterality: Bilateral;    TONSILLECTOMY      UPPER GASTROINTESTINAL ENDOSCOPY        Medications:   Current Outpatient Medications   Medication Sig    albuterol (PROVENTIL/VENTOLIN HFA) 90 mcg/actuation inhaler ProAir HFA 90 mcg/actuation aerosol inhaler    azelastine (ASTELIN) 137 mcg (0.1 %) nasal spray USE 1 SPRAY TWICE A DAY INTO EACH NOSTRIL    estradioL (VIVELLE-DOT) 0.1 mg/24 hr PTSW Place 1 patch onto the skin twice a week.    estradiol 0.05 mg/24 hr td ptsw (VIVELLE-DOT) 0.05 mg/24 hr Place 1 patch onto the skin twice a week.    lisdexamfetamine (VYVANSE) 30 MG capsule Vyvanse 30 mg capsule    loratadine (CLARITIN) 10 mg tablet Claritin    montelukast (SINGULAIR) 10 mg tablet Take 10 mg by mouth every evening.    omeprazole (PRILOSEC) 40 MG capsule omeprazole 40 mg capsule,delayed release   TAKE 1 CAPSULE BY MOUTH EVERY DAY    prasterone, dhea, (INTRAROSA) 6.5 mg Inst Place 6.5 mg vaginally every evening.    XIIDRA 5 % Dpet INSTILL 1 DROP TWICE A DAY INTO BOTH EYES     No current facility-administered medications for this visit.       Allergies: Review of patient's allergies indicates:  No Known Allergies     Precautions: universal    OB/GYN History:  Caesarean, vaginal dryness, vaginal erythema, menopause, painful vaginal penetration, and pelvic pain    Bladder/Bowel History: Previous PF therapy      SUBJECTIVE     Date of onset: 7/1/2022  History of current complaint: Patient has been experiencing right anterolateral hip pain and right gluteal pain that feels like her sciatic nerve. She has no complaints of urinary or bowel issues. She has had pain with intercourse since hysterectomy on 7/1/2022. She feels this is a hip issue rather than a pelvic floor issue.  Patient's goals for therapy: to relieve pain  Pain: Patient reports 7/10, with 0 being the lowest and 10 being the highest.    Activities that cause symptoms:  prolonged standing/walking    Previous treatment included None    Sexually active?  Yes    Frequency of urination:   Daytime: unknown           Nighttime: unknown    Types of fluid intake: water  Diet: WNL  Current exercise:None    Occupation: Pt works at a desk job.    OBJECTIVE     ORTHO SCREEN  Posture: WNL  Pelvic alignment: no sign of deviations noted in supine, right leg slightly longer than left leg  Palpation: all hip musculature pain free, some point tenderness at right hamstring origin and adductor origin  SLR: right pelvic anterior rotation with left leg lift  Range of motion: right hip range of motion WNL     ABDOMINALS  Scarring: Port site abdominal scars, hysterectomy scar  Diastasis: 1 fingers above umbilicus, 1 fingers at umbilicus, 0 fingers below umbilicus   Abdominal strength: Rectus: 4/5     TA: 3+/5  Chaperone: refused  Consent signed: Yes    VAGINAL PELVIC FLOOR EXAM    EXTERNAL ASSESSMENT  Introitus: WNL  Skin condition: redness noted  Scarring: none   Sensation: WNL   Pain:  none  Voluntary contraction: visible lift  Voluntary relaxation: nil  Involuntary contraction: visible lift  Bearing down: nil  Perineal descent: absent      INTERNAL ASSESSMENT  Pain: tender areas noted as follows: bilateral PFM on all layers   Sensation: able to localize pressure appropriately  Vaginal vault: stenotic   Muscle Bulk: hypertonus   Muscle Power: 3+/5     Quality of contraction: decreased hold and incomplete relaxation   Specificity: patient contracts: gluts and adductors   Coordination: tends to hold breath during PFM contration   Prolapse check:DNA  Comments: very stenotic throughout with major muscle hypertrophy. Uncomfortable with all muscle palpation.      Functional Limitations Reports     Tool: FOTO Modified Oswestry Low Back Pain Disability Survey  Score: 16% Limitation     Tool: FOTO Urinary Problem Survey  Score: 29% Limitation     TREATMENT      Education: instructed on general anatomy/physiology of urinary/bowel system; discussed plan of care with patient and parent/guardian;  instructed in benefits/risks of treatment; instructed in alternative methods of treatment; instructed in risks of refusing treatment; patient a parent agreed to treatment plan.     Also educated in: anatomy/physiology of pelvic floor, posture/body mechanices, diaphragmatic breathing, proper bearing down techniques, and behavior modifications    ASSESSMENT      This is a 47 y.o. female referred to outpatient physical therapy and presents with a medical diagnosis of post-op pain. Although her pain is presenting in her right hip, she had no pain with hip palpation. She has a positive SLR with anterior right hip rotation with left leg raise indicating core and hip weakness. Her pain and restriction in her pelvic floor musculature is likely contributing to her hip pain. Patient will benefit from skilled physical therapy to improve muscle length and coordination to relieve pain and improve functional mobility so that patient may return to ADL's pain free at prior level of function.    Educational/Spiritual/Cultural needs: none  Abuse/Neglect: no signs  Nutritional Status: WDWN   Fall Risk: none    Pt's spiritual, cultural and educational needs considered and pt agreeable to plan of care and goals as stated below:     Medical necessity is demonstrated by the following IMPAIRMENTS/PROMBLEMS     History  Co-morbidities and personal factors that may impact the plan of care Examination  Body Structures and Functions, activity limitations and participation restrictions that may impact the plan of care Clinical Presentation   Decision Making/ Complexity Score   Co-morbidities:                 Personal Factors:    Body Regions/Systems/Functions:    pelvic asymmetry, poor trunk stability, pelvic floor tenderness, decreased pelvic muscle strength, decreased endurance of the pelvic muscles, decreased phasic ability of the pelvic muscles, increased tension of the pelvic muscles, and poor quality of pelvic muscle contraction            Activity limitations:   Barriers to Learning: none  Environmental Barriers: none noted    Participation Restrictions: Unable ot have intercourse or perform iADL's pain free          moderate           PLAN    Frequency: 1x per week  Duration: 12 weeks  Short Term Goals: 6 weeks   Pt/family will be independent with HEP for continued self-management of symptoms.  Pt will demonstrate minimal increase in PFM activity on SEMG with bearing down for improved ability to evacuate bowels.    Long Term Goals: 12 weeks   Pt will report successfully having intercourse with < or = 2/10 pain for an improvement in activity tolerance.   Pt will report improved ability to tolerate standing > or = 2 hours with < or = 2/10 pain for improved ability to complete iADL's.  Pt will report improved ability to tolerate sitting > or = 3 hours with < or = 2/10 pain for improved ability to complete work duties.   Pt will demonstrate minimal increase in PFM activity on SEMG with bearing down for improved ability to relax PFM for intercourse and well woman's exam.    Physical therapy will include: therapeutic exercises, therapeutic activity, neuromuscular re-education, manual therapy, modalities PRN, and patient/family education  Pt may be seen by PTA as part of the rehabilitation team.     Therapist: Yasmine Chen, PT, DPT  12/14/2022

## 2022-12-16 ENCOUNTER — OFFICE VISIT (OUTPATIENT)
Dept: CARDIOLOGY | Facility: CLINIC | Age: 47
End: 2022-12-16
Payer: COMMERCIAL

## 2022-12-16 VITALS
HEIGHT: 69 IN | SYSTOLIC BLOOD PRESSURE: 108 MMHG | BODY MASS INDEX: 17.77 KG/M2 | WEIGHT: 120 LBS | HEART RATE: 81 BPM | DIASTOLIC BLOOD PRESSURE: 73 MMHG

## 2022-12-16 DIAGNOSIS — Z86.32 HISTORY OF GESTATIONAL DIABETES: ICD-10-CM

## 2022-12-16 DIAGNOSIS — Z90.721 S/P HYSTERECTOMY WITH OOPHORECTOMY: ICD-10-CM

## 2022-12-16 DIAGNOSIS — Z91.89 AT HIGH RISK FOR BREAST CANCER: ICD-10-CM

## 2022-12-16 DIAGNOSIS — Z13.6 ENCOUNTER FOR SCREENING FOR CARDIOVASCULAR DISORDERS: ICD-10-CM

## 2022-12-16 DIAGNOSIS — Z15.09 MONOALLELIC MUTATION OF BRCA2 GENE: ICD-10-CM

## 2022-12-16 DIAGNOSIS — Z15.01 MONOALLELIC MUTATION OF BRCA2 GENE: ICD-10-CM

## 2022-12-16 DIAGNOSIS — N95.1 MENOPAUSAL SYMPTOMS: ICD-10-CM

## 2022-12-16 DIAGNOSIS — Z91.89 AT RISK FOR CORONARY ARTERY DISEASE: Primary | ICD-10-CM

## 2022-12-16 DIAGNOSIS — Z90.710 S/P HYSTERECTOMY WITH OOPHORECTOMY: ICD-10-CM

## 2022-12-16 PROCEDURE — 99205 PR OFFICE/OUTPT VISIT, NEW, LEVL V, 60-74 MIN: ICD-10-PCS | Mod: S$GLB,,, | Performed by: INTERNAL MEDICINE

## 2022-12-16 PROCEDURE — 3008F PR BODY MASS INDEX (BMI) DOCUMENTED: ICD-10-PCS | Mod: CPTII,S$GLB,, | Performed by: INTERNAL MEDICINE

## 2022-12-16 PROCEDURE — 99205 OFFICE O/P NEW HI 60 MIN: CPT | Mod: S$GLB,,, | Performed by: INTERNAL MEDICINE

## 2022-12-16 PROCEDURE — 3078F DIAST BP <80 MM HG: CPT | Mod: CPTII,S$GLB,, | Performed by: INTERNAL MEDICINE

## 2022-12-16 PROCEDURE — 1160F PR REVIEW ALL MEDS BY PRESCRIBER/CLIN PHARMACIST DOCUMENTED: ICD-10-PCS | Mod: CPTII,S$GLB,, | Performed by: INTERNAL MEDICINE

## 2022-12-16 PROCEDURE — 3074F PR MOST RECENT SYSTOLIC BLOOD PRESSURE < 130 MM HG: ICD-10-PCS | Mod: CPTII,S$GLB,, | Performed by: INTERNAL MEDICINE

## 2022-12-16 PROCEDURE — 3074F SYST BP LT 130 MM HG: CPT | Mod: CPTII,S$GLB,, | Performed by: INTERNAL MEDICINE

## 2022-12-16 PROCEDURE — 1160F RVW MEDS BY RX/DR IN RCRD: CPT | Mod: CPTII,S$GLB,, | Performed by: INTERNAL MEDICINE

## 2022-12-16 PROCEDURE — 3078F PR MOST RECENT DIASTOLIC BLOOD PRESSURE < 80 MM HG: ICD-10-PCS | Mod: CPTII,S$GLB,, | Performed by: INTERNAL MEDICINE

## 2022-12-16 PROCEDURE — 3008F BODY MASS INDEX DOCD: CPT | Mod: CPTII,S$GLB,, | Performed by: INTERNAL MEDICINE

## 2022-12-16 PROCEDURE — 1159F MED LIST DOCD IN RCRD: CPT | Mod: CPTII,S$GLB,, | Performed by: INTERNAL MEDICINE

## 2022-12-16 PROCEDURE — 1159F PR MEDICATION LIST DOCUMENTED IN MEDICAL RECORD: ICD-10-PCS | Mod: CPTII,S$GLB,, | Performed by: INTERNAL MEDICINE

## 2022-12-16 RX ORDER — FLUCONAZOLE 150 MG/1
TABLET ORAL
COMMUNITY
Start: 2022-12-09

## 2022-12-16 NOTE — PROGRESS NOTES
"Subjective:   Patient ID:  Linda Acosta is a 47 y.o. female is a new patient who presents for evaluation of Menopausal symptems and Diabetes    47 y.o. female   presents today for follow-up. Patient's last menstrual period was 2022 (exact date)..  She had hysterectomy and BSO on 2020 and to with Dr. Mittal secondary to BRCA2 mutation.  She was started on Vivelle-Dot at the end of August and reports that she physically feels better but I am head case.  She reports hot flashes and night sweats have resolved but she has extreme mood swings.  She also reports some obsessive thoughts.  That are "intrusive" she has seen her psychiatrist and a psychiatrist recommended that she 1st discuss with me her hormone replacement therapy  She does have vaginal dryness.  And tried to have intercourse 1 time she reports.  That she had bleeding and it was painful she has been seen twice by her gyn locally and was treated with silver nitrate because granulation tissue was seen on the cuff.    She has not had intercourse again because of this     HPI:   2 kids 14 and 10. She had gestational DM with first child that was managed with diet.   Former smoker in college, 1 ppd x 4 years  Patient uses nicotine patch when she is under stress.  Paternal grandmother had CVA, she also had DM and cancer  Patient chest pressure stabbing and is short  She feels like she is not breathing deeply  Patient is very active    Patient Active Problem List   Diagnosis    Monoallelic mutation of BRCA2 gene    At high risk for breast cancer    Right hip pain    Sciatic pain, right    S/P hysterectomy with oophorectomy    Pelvic floor tension    High-tone pelvic floor dysfunction     /73   Pulse 81   Ht 5' 9" (1.753 m)   Wt 54.4 kg (120 lb)   LMP 2022 (Exact Date)   BMI 17.72 kg/m²   Body mass index is 17.72 kg/m².  CrCl cannot be calculated (Patient's most recent lab result is older than the maximum 7 days " allowed.).    Lab Results   Component Value Date     06/30/2022    K 4.4 06/30/2022     06/30/2022    CO2 25 06/30/2022    BUN 13 06/30/2022    CREATININE 0.8 06/30/2022    GLU 81 06/30/2022    AST 19 11/12/2020    ALT 15 11/12/2020    ALBUMIN 4.1 11/12/2020    PROT 7.4 11/12/2020    BILITOT 0.4 11/12/2020    WBC 5.23 06/30/2022    HGB 16.6 (H) 06/30/2022    HCT 47.9 06/30/2022    MCV 91 06/30/2022     06/30/2022    TSH 1.256 11/12/2020       Current Outpatient Medications   Medication Sig    albuterol (PROVENTIL/VENTOLIN HFA) 90 mcg/actuation inhaler ProAir HFA 90 mcg/actuation aerosol inhaler    azelastine (ASTELIN) 137 mcg (0.1 %) nasal spray USE 1 SPRAY TWICE A DAY INTO EACH NOSTRIL    estradioL (VIVELLE-DOT) 0.1 mg/24 hr PTSW Place 1 patch onto the skin twice a week.    fluconazole (DIFLUCAN) 150 MG Tab Take by mouth as needed.    lisdexamfetamine (VYVANSE) 30 MG capsule Vyvanse 30 mg capsule    loratadine (CLARITIN) 10 mg tablet Claritin    montelukast (SINGULAIR) 10 mg tablet Take 10 mg by mouth every evening.    omeprazole (PRILOSEC) 40 MG capsule omeprazole 40 mg capsule,delayed release   TAKE 1 CAPSULE BY MOUTH EVERY DAY    prasterone, dhea, (INTRAROSA) 6.5 mg Inst Place 6.5 mg vaginally every evening.    XIIDRA 5 % Dpet INSTILL 1 DROP TWICE A DAY INTO BOTH EYES     No current facility-administered medications for this visit.       Review of Systems   Constitutional: Negative for chills, decreased appetite, malaise/fatigue, night sweats, weight gain and weight loss.   Eyes:  Negative for blurred vision, double vision, visual disturbance and visual halos.   Cardiovascular:  Negative for chest pain, claudication, cyanosis, dyspnea on exertion, irregular heartbeat, leg swelling, near-syncope, orthopnea, palpitations, paroxysmal nocturnal dyspnea and syncope.   Respiratory:  Negative for cough, hemoptysis, snoring, sputum production and wheezing.    Endocrine: Negative for cold intolerance,  heat intolerance, polydipsia and polyphagia.   Hematologic/Lymphatic: Negative for adenopathy and bleeding problem. Does not bruise/bleed easily.   Skin:  Negative for flushing, itching, poor wound healing and rash.   Musculoskeletal:  Negative for arthritis, back pain, falls, gout, joint pain, joint swelling, muscle cramps, muscle weakness, myalgias, neck pain and stiffness.   Gastrointestinal:  Negative for bloating, abdominal pain, anorexia, diarrhea, dysphagia, excessive appetite, flatus, hematemesis, jaundice, melena and nausea.   Genitourinary:  Negative for hesitancy and incomplete emptying.   Neurological:  Negative for aphonia, brief paralysis, difficulty with concentration, disturbances in coordination, excessive daytime sleepiness, dizziness, focal weakness, light-headedness, loss of balance and weakness.   Psychiatric/Behavioral:  Negative for altered mental status, depression, hallucinations, hypervigilance, memory loss, substance abuse and suicidal ideas. The patient does not have insomnia and is not nervous/anxious.      Objective:   Physical Exam  Constitutional:       General: She is not in acute distress.     Appearance: She is well-developed. She is not diaphoretic.   HENT:      Head: Normocephalic and atraumatic.      Nose: Nose normal.      Mouth/Throat:      Pharynx: No oropharyngeal exudate.   Eyes:      General: No scleral icterus.        Right eye: No discharge.         Left eye: No discharge.      Conjunctiva/sclera: Conjunctivae normal.      Pupils: Pupils are equal, round, and reactive to light.   Neck:      Thyroid: No thyromegaly.      Vascular: No JVD.      Trachea: No tracheal deviation.   Cardiovascular:      Rate and Rhythm: Normal rate and regular rhythm.      Pulses: Intact distal pulses.      Heart sounds: Normal heart sounds. No murmur heard.    No friction rub. No gallop.   Pulmonary:      Effort: Pulmonary effort is normal. No respiratory distress.      Breath sounds: Normal  breath sounds. No stridor. No wheezing or rales.   Chest:      Chest wall: No tenderness.   Abdominal:      General: Bowel sounds are normal. There is no distension.      Palpations: Abdomen is soft. There is no mass.      Tenderness: There is no abdominal tenderness. There is no guarding or rebound.   Musculoskeletal:         General: No tenderness. Normal range of motion.      Cervical back: Normal range of motion and neck supple.   Lymphadenopathy:      Cervical: No cervical adenopathy.   Skin:     General: Skin is warm.      Coloration: Skin is not pale.      Findings: No erythema or rash.   Neurological:      Mental Status: She is alert and oriented to person, place, and time.      Cranial Nerves: No cranial nerve deficit.      Motor: No abnormal muscle tone.      Coordination: Coordination normal.      Deep Tendon Reflexes: Reflexes are normal and symmetric. Reflexes normal.   Psychiatric:         Behavior: Behavior normal.         Thought Content: Thought content normal.         Judgment: Judgment normal.     Assessment:     1. At risk for coronary artery disease    2. Menopausal symptoms    3. History of gestational diabetes    4. Encounter for screening for cardiovascular disorders    5. S/P hysterectomy with oophorectomy    6. At high risk for breast cancer    7. Monoallelic mutation of BRCA2 gene      Plan:   Linda was seen today for menopausal symptems and diabetes.    Diagnoses and all orders for this visit:    At risk for coronary artery disease  -     Lipid Panel; Future  -     LIPOPROTEIN A (LPA); Future  -     CRP, High Sensitivity; Future  -     IN OFFICE EKG 12-LEAD (to [facility])    Menopausal symptoms  -     Ambulatory referral/consult to Cardiology    History of gestational diabetes  -     Ambulatory referral/consult to Cardiology  -     Hemoglobin A1C; Standing    Encounter for screening for cardiovascular disorders  -     CT Calcium Scoring Cardiac; Future    S/P hysterectomy with  oophorectomy    At high risk for breast cancer    Monoallelic mutation of BRCA2 gene      Patient is at risk for coronary artery disease given gestational DM and early menopause.   Counseled on importance of heart healthy diet low in saturated and trans fat and salt as well gradually starting a regular aerobic exercise regimen with goal of 30min 5x/week. Recommend BP diary. Call if systolic BP > 130 mmHg on checking repeatedly    RTC 4 months  I Spent more then 45 min discussing her concerns.

## 2022-12-20 ENCOUNTER — HOSPITAL ENCOUNTER (OUTPATIENT)
Dept: RADIOLOGY | Facility: HOSPITAL | Age: 47
Discharge: HOME | End: 2022-12-20
Attending: INTERNAL MEDICINE
Payer: COMMERCIAL

## 2022-12-20 DIAGNOSIS — Z13.6 ENCOUNTER FOR SCREENING FOR CARDIOVASCULAR DISORDERS: ICD-10-CM

## 2022-12-20 PROCEDURE — 75571 CT CALCIUM SCORING CARDIAC: ICD-10-PCS | Mod: 26,,, | Performed by: RADIOLOGY

## 2022-12-20 PROCEDURE — 75571 CT HRT W/O DYE W/CA TEST: CPT | Mod: TC

## 2022-12-20 PROCEDURE — 75571 CT HRT W/O DYE W/CA TEST: CPT | Mod: 26,,, | Performed by: RADIOLOGY

## 2022-12-30 ENCOUNTER — PATIENT MESSAGE (OUTPATIENT)
Dept: CARDIOLOGY | Facility: CLINIC | Age: 47
End: 2022-12-30
Payer: COMMERCIAL

## 2022-12-30 ENCOUNTER — LAB VISIT (OUTPATIENT)
Dept: LAB | Facility: HOSPITAL | Age: 47
End: 2022-12-30
Attending: INTERNAL MEDICINE
Payer: COMMERCIAL

## 2022-12-30 DIAGNOSIS — Z91.89 AT RISK FOR CORONARY ARTERY DISEASE: ICD-10-CM

## 2022-12-30 DIAGNOSIS — Z86.32 HISTORY OF GESTATIONAL DIABETES: ICD-10-CM

## 2022-12-30 LAB
CHOLEST SERPL-MCNC: 191 MG/DL (ref 120–199)
CHOLEST/HDLC SERPL: 2.8 {RATIO} (ref 2–5)
HDLC SERPL-MCNC: 68 MG/DL (ref 40–75)
HDLC SERPL: 35.6 % (ref 20–50)
LDLC SERPL CALC-MCNC: 112 MG/DL (ref 63–159)
NONHDLC SERPL-MCNC: 123 MG/DL
TRIGL SERPL-MCNC: 55 MG/DL (ref 30–150)

## 2022-12-30 PROCEDURE — 80061 LIPID PANEL: CPT | Performed by: INTERNAL MEDICINE

## 2022-12-30 PROCEDURE — 36415 COLL VENOUS BLD VENIPUNCTURE: CPT | Performed by: INTERNAL MEDICINE

## 2022-12-30 PROCEDURE — 86141 C-REACTIVE PROTEIN HS: CPT | Performed by: INTERNAL MEDICINE

## 2022-12-30 PROCEDURE — 83695 ASSAY OF LIPOPROTEIN(A): CPT | Performed by: INTERNAL MEDICINE

## 2022-12-30 PROCEDURE — 83036 HEMOGLOBIN GLYCOSYLATED A1C: CPT | Performed by: INTERNAL MEDICINE

## 2022-12-31 LAB
CRP SERPL-MCNC: <0.3 MG/L (ref 0–3.19)
ESTIMATED AVG GLUCOSE: 91 MG/DL (ref 68–131)
HBA1C MFR BLD: 4.8 % (ref 4–5.6)

## 2023-01-05 ENCOUNTER — TELEPHONE (OUTPATIENT)
Dept: REHABILITATION | Facility: HOSPITAL | Age: 48
End: 2023-01-05
Payer: COMMERCIAL

## 2023-01-05 LAB — LPA SERPL-MCNC: 5 MG/DL (ref 0–30)

## 2023-01-06 ENCOUNTER — CLINICAL SUPPORT (OUTPATIENT)
Dept: REHABILITATION | Facility: HOSPITAL | Age: 48
End: 2023-01-06
Attending: EMERGENCY MEDICINE
Payer: COMMERCIAL

## 2023-01-06 DIAGNOSIS — Z90.710 S/P HYSTERECTOMY WITH OOPHORECTOMY: ICD-10-CM

## 2023-01-06 DIAGNOSIS — M54.31 SCIATIC PAIN, RIGHT: ICD-10-CM

## 2023-01-06 DIAGNOSIS — M62.89 PELVIC FLOOR TENSION: ICD-10-CM

## 2023-01-06 DIAGNOSIS — M25.551 RIGHT HIP PAIN: Primary | ICD-10-CM

## 2023-01-06 DIAGNOSIS — Z90.721 S/P HYSTERECTOMY WITH OOPHORECTOMY: ICD-10-CM

## 2023-01-06 DIAGNOSIS — M62.89 HIGH-TONE PELVIC FLOOR DYSFUNCTION: ICD-10-CM

## 2023-01-06 PROCEDURE — 97140 MANUAL THERAPY 1/> REGIONS: CPT | Mod: PN | Performed by: PHYSICAL THERAPIST

## 2023-01-06 NOTE — PROGRESS NOTES
Pelvic Health Physical Therapy   Treatment Note     Name: Linda Acosta  Clinic Number: [id number]    Therapy Diagnosis:   Encounter Diagnoses   Name Primary?    Right hip pain Yes    Sciatic pain, right     S/P hysterectomy with oophorectomy     Pelvic floor tension     High-tone pelvic floor dysfunction      Physician: No ref. provider found    Visit Date: 1/6/2023    Physician Orders: PT Eval and Treat Pelvic Health  Medical Diagnosis from Referral: Post-op pain [G89.18]  Evaluation Date: 12/9/2022  Authorization Period Expiration: 12/31/2022  Plan of Care Expiration: 6/9/2023  Progress Note Due: 1/9/2023  Visit # 2/12 Visits authorized: 1/ 1   FOTO: 1/3    Cancelled Visits: 0  No Show Visits: 0    Time In: 13:05  Time Out: 13:55  Total Billable Time: 50 minutes    Precautions: Standard    Subjective     Pt reports: right hip pain and low back pain. Stretches helped that she was given last visit. Was given HS ROM and low back pain. Working from home sitting most of the day. Has pain when driving. Had a large cyst on her right ovary so needed urgent hysterectomy. Pelvic pain is not as bad.     She was compliant with home exercise program.  Response to previous treatment: pain has improved  Functional change: improved pain with stretches    Pain in:  mild/moderate - improving  Pain out: mild/10  Location: right hip/pelvis      Objective     Linda received the following manual therapy techniques: to develop flexibility and extensibility for 50 minutes including:  Trigger point release to the right psoas, right HS and gastroc ROM, Right iliac depression, Right hip extension, right single knee to chest, and right piriformis stretch.     Home Exercises Provided and Patient Education Provided     Education provided:   - posture/body mechanices and behavior modifications  Discussed progression of plan of care with patient; educated pt in activity modification; reviewed HEP with pt. Pt demonstrated and  verbalized understanding of all instruction and was not provided with a handout of HEP (see Patient Instructions).    Written Home Exercises Provided:  not modified today .  Exercises were reviewed and Linda was able to demonstrate them prior to the end of the session.  Linda demonstrated good  understanding of the education provided.     See EMR under Patient Instructions for exercises provided  not provided today .    Assessment     Patient with imrpoved pain after visit. Good carryover with physical therapist recommendations.   Linda Is progressing well towards her goals.   Pt prognosis is Excellent.     Pt will continue to benefit from skilled outpatient physical therapy to address the deficits listed in the problem list box on initial evaluation, provide pt/family education and to maximize pt's level of independence in the home and community environment.     Pt's spiritual, cultural and educational needs considered and pt agreeable to plan of care and goals.     Anticipated barriers to physical therapy: none  Short Term Goals: 6 weeks   Pt/family will be independent with HEP for continued self-management of symptoms.  Pt will demonstrate minimal increase in PFM activity on SEMG with bearing down for improved ability to evacuate bowels.     Long Term Goals: 12 weeks   Pt will report successfully having intercourse with < or = 2/10 pain for an improvement in activity tolerance.   Pt will report improved ability to tolerate standing > or = 2 hours with < or = 2/10 pain for improved ability to complete iADL's.  Pt will report improved ability to tolerate sitting > or = 3 hours with < or = 2/10 pain for improved ability to complete work duties.   Pt will demonstrate minimal increase in PFM activity on SEMG with bearing down for improved ability to relax PFM for intercourse and well woman's exam.    Plan     Continue with progressive hip and PF mobility.     Tamir Rocha, PT

## 2023-01-11 NOTE — PROGRESS NOTES
As Dr. Man requested me to do is to inform Mrs. Acosta about her test results. I called and informed Mrs. Acosta that her labs are normal. She verbalized and understood.

## 2023-01-20 ENCOUNTER — CLINICAL SUPPORT (OUTPATIENT)
Dept: REHABILITATION | Facility: HOSPITAL | Age: 48
End: 2023-01-20
Payer: COMMERCIAL

## 2023-01-20 DIAGNOSIS — M54.31 SCIATIC PAIN, RIGHT: ICD-10-CM

## 2023-01-20 DIAGNOSIS — M25.551 RIGHT HIP PAIN: ICD-10-CM

## 2023-01-20 DIAGNOSIS — Z90.721 S/P HYSTERECTOMY WITH OOPHORECTOMY: ICD-10-CM

## 2023-01-20 DIAGNOSIS — M62.89 HIGH-TONE PELVIC FLOOR DYSFUNCTION: Primary | ICD-10-CM

## 2023-01-20 DIAGNOSIS — Z90.710 S/P HYSTERECTOMY WITH OOPHORECTOMY: ICD-10-CM

## 2023-01-20 DIAGNOSIS — M62.89 PELVIC FLOOR TENSION: ICD-10-CM

## 2023-01-20 PROCEDURE — 97140 MANUAL THERAPY 1/> REGIONS: CPT | Mod: PN | Performed by: PHYSICAL THERAPIST

## 2023-01-20 NOTE — PROGRESS NOTES
Pelvic Health Physical Therapy   Treatment Note     Name: Linda Acosta  Clinic Number: [id number]    Therapy Diagnosis:   Encounter Diagnoses   Name Primary?    High-tone pelvic floor dysfunction Yes    Right hip pain     Sciatic pain, right     S/P hysterectomy with oophorectomy     Pelvic floor tension        Physician: Heydi Walker*    Visit Date: 1/20/2023    Physician Orders: PT Eval and Treat Pelvic Health  Medical Diagnosis from Referral: Post-op pain [G89.18]  Evaluation Date: 12/9/2022  Authorization Period Expiration: 12/31/2022  Plan of Care Expiration: 6/9/2023  Progress Note Due: 1/9/2023  Visit # 3/12 Visits authorized: 3/20  FOTO: 1/3    Cancelled Visits: 0  No Show Visits: 0    Time In: 1:02 PM   Time Out: 1:48 PM   Total Billable Time: 46 minutes    Precautions: Standard    Subjective     Pt reports: Right sit bone pain.     She was compliant with home exercise program.  Response to previous treatment: pain has improved  Functional change: improved pain with stretches    Pain in:  states that her pain comes and goes  Pain out: mild/10  Location: right hip/pelvis      Objective     Linda received the following manual therapy techniques: to develop flexibility and extensibility for  46 minutes including:  Trigger point release to the right psoas, right HS ROM, Right iliac depression, Right hip extension, right single knee to chest, and right piriformis stretch. Educated on home piriformis stretch in standing and obturator internus stretch in standing. External obturator internus manual therapy through pt's clothes.     Home Exercises Provided and Patient Education Provided     Education provided:   - posture/body mechanices and behavior modifications  Discussed progression of plan of care with patient; educated pt in activity modification; reviewed HEP with pt. Pt demonstrated and verbalized understanding of all instruction and was not provided with a handout of HEP (see  Patient Instructions).    Written Home Exercises Provided:  not modified today .  Exercises were reviewed and Linda was able to demonstrate them prior to the end of the session.  Linda demonstrated good  understanding of the education provided.     See EMR under Patient Instructions for exercises provided  not provided today .    Assessment   Improved pain after manual therapy. Improved tolerance for standing piriformis stretch. Complaints of pinching with supine piriformis stretch.   Linda Is progressing well towards her goals.   Pt prognosis is Excellent.     Pt will continue to benefit from skilled outpatient physical therapy to address the deficits listed in the problem list box on initial evaluation, provide pt/family education and to maximize pt's level of independence in the home and community environment.     Pt's spiritual, cultural and educational needs considered and pt agreeable to plan of care and goals.     Anticipated barriers to physical therapy: none  Short Term Goals: 6 weeks   Pt/family will be independent with HEP for continued self-management of symptoms.  Pt will demonstrate minimal increase in PFM activity on SEMG with bearing down for improved ability to evacuate bowels.     Long Term Goals: 12 weeks   Pt will report successfully having intercourse with < or = 2/10 pain for an improvement in activity tolerance.   Pt will report improved ability to tolerate standing > or = 2 hours with < or = 2/10 pain for improved ability to complete iADL's.  Pt will report improved ability to tolerate sitting > or = 3 hours with < or = 2/10 pain for improved ability to complete work duties.   Pt will demonstrate minimal increase in PFM activity on SEMG with bearing down for improved ability to relax PFM for intercourse and well woman's exam.    Plan     Continue with progressive hip and PF mobility. Monitor right hip pain.     Tamir Rocha, PT

## 2023-03-07 ENCOUNTER — PATIENT MESSAGE (OUTPATIENT)
Dept: REHABILITATION | Facility: HOSPITAL | Age: 48
End: 2023-03-07
Payer: COMMERCIAL

## 2023-05-03 ENCOUNTER — DOCUMENTATION ONLY (OUTPATIENT)
Dept: REHABILITATION | Facility: HOSPITAL | Age: 48
End: 2023-05-03
Payer: COMMERCIAL

## 2023-05-03 PROBLEM — Z90.721 S/P HYSTERECTOMY WITH OOPHORECTOMY: Status: RESOLVED | Noted: 2022-12-14 | Resolved: 2023-05-03

## 2023-05-03 PROBLEM — M54.31 SCIATIC PAIN, RIGHT: Status: RESOLVED | Noted: 2022-12-14 | Resolved: 2023-05-03

## 2023-05-03 PROBLEM — M62.89 PELVIC FLOOR TENSION: Status: RESOLVED | Noted: 2022-12-14 | Resolved: 2023-05-03

## 2023-05-03 PROBLEM — M25.551 RIGHT HIP PAIN: Status: RESOLVED | Noted: 2022-12-14 | Resolved: 2023-05-03

## 2023-05-03 PROBLEM — Z90.710 S/P HYSTERECTOMY WITH OOPHORECTOMY: Status: RESOLVED | Noted: 2022-12-14 | Resolved: 2023-05-03

## 2023-05-03 PROBLEM — M62.89 HIGH-TONE PELVIC FLOOR DYSFUNCTION: Status: RESOLVED | Noted: 2022-12-14 | Resolved: 2023-05-03

## 2023-05-03 NOTE — PROGRESS NOTES
Pelvic Health Physical Therapy   Discharge Summary     Name: Linad Acosta  Clinic Number: [id number]    Therapy Diagnosis:        Encounter Diagnoses   Name Primary?    High-tone pelvic floor dysfunction Yes    Right hip pain      Sciatic pain, right      S/P hysterectomy with oophorectomy      Pelvic floor tension           Physician: Heydi Walker*      Physician Orders: PT Eval and Treat Pelvic Health  Medical Diagnosis from Referral: Post-op pain [G89.18]  Evaluation Date: 12/9/2022  Authorization Period Expiration: 12/31/2022  Plan of Care Expiration: 6/9/2023  Progress Note Due: 1/9/2023  Visit # 3/12 Visits authorized: 3/20  FOTO: 1/3     Cancelled Visits: 0  No Show Visits: 0      Assessment     Goals not formally re-assesed. Patient did not complete plan of care. I did message her on 3/8/23. She states that she was feeling much better and did not require further physical therapy.    Short Term Goals: 6 weeks   Pt/family will be independent with HEP for continued self-management of symptoms. Goal met  Pt will demonstrate minimal increase in PFM activity on SEMG with bearing down for improved ability to evacuate bowels. Goal not met - not reassessed     Long Term Goals: 12 weeks   Pt will report successfully having intercourse with < or = 2/10 pain for an improvement in activity tolerance. Goal not met - not reassessed  Pt will report improved ability to tolerate standing > or = 2 hours with < or = 2/10 pain for improved ability to complete iADL's.Goal not met - not reassessed  Pt will report improved ability to tolerate sitting > or = 3 hours with < or = 2/10 pain for improved ability to complete work duties. Goal not met - not reassessed  Pt will demonstrate minimal increase in PFM activity on SEMG with bearing down for improved ability to relax PFM for intercourse and well woman's exam.Goal not met - not reassessed    Plan     Discharge physical therapy at this time.     Tamir Rocha, PT

## 2023-05-17 ENCOUNTER — LAB VISIT (OUTPATIENT)
Dept: LAB | Facility: OTHER | Age: 48
End: 2023-05-17
Attending: PHYSICIAN ASSISTANT
Payer: COMMERCIAL

## 2023-05-17 ENCOUNTER — OFFICE VISIT (OUTPATIENT)
Dept: OBSTETRICS AND GYNECOLOGY | Facility: CLINIC | Age: 48
End: 2023-05-17
Payer: COMMERCIAL

## 2023-05-17 VITALS
DIASTOLIC BLOOD PRESSURE: 72 MMHG | WEIGHT: 117.81 LBS | SYSTOLIC BLOOD PRESSURE: 102 MMHG | HEIGHT: 69 IN | BODY MASS INDEX: 17.45 KG/M2

## 2023-05-17 DIAGNOSIS — Z13.820 SCREENING FOR OSTEOPOROSIS: ICD-10-CM

## 2023-05-17 DIAGNOSIS — R92.8 ABNORMAL MAMMOGRAM: ICD-10-CM

## 2023-05-17 DIAGNOSIS — Z15.09 MONOALLELIC MUTATION OF BRCA2 GENE: ICD-10-CM

## 2023-05-17 DIAGNOSIS — Z01.419 ENCOUNTER FOR GYNECOLOGICAL EXAMINATION WITHOUT ABNORMAL FINDING: Primary | ICD-10-CM

## 2023-05-17 DIAGNOSIS — Z91.89 AT HIGH RISK FOR BREAST CANCER: ICD-10-CM

## 2023-05-17 DIAGNOSIS — N95.1 MENOPAUSAL SYMPTOMS: ICD-10-CM

## 2023-05-17 DIAGNOSIS — A63.0 CONDYLOMA: ICD-10-CM

## 2023-05-17 DIAGNOSIS — Z15.01 MONOALLELIC MUTATION OF BRCA2 GENE: ICD-10-CM

## 2023-05-17 DIAGNOSIS — Z13.220 SCREENING, LIPID: ICD-10-CM

## 2023-05-17 DIAGNOSIS — Z13.1 SCREENING FOR DIABETES MELLITUS: ICD-10-CM

## 2023-05-17 LAB
ALBUMIN SERPL BCP-MCNC: 4.8 G/DL (ref 3.5–5.2)
ALP SERPL-CCNC: 55 U/L (ref 55–135)
ALT SERPL W/O P-5'-P-CCNC: 17 U/L (ref 10–44)
ANION GAP SERPL CALC-SCNC: 10 MMOL/L (ref 8–16)
AST SERPL-CCNC: 17 U/L (ref 10–40)
BASOPHILS # BLD AUTO: 0.07 K/UL (ref 0–0.2)
BASOPHILS NFR BLD: 1 % (ref 0–1.9)
BILIRUB SERPL-MCNC: 0.8 MG/DL (ref 0.1–1)
BUN SERPL-MCNC: 19 MG/DL (ref 6–20)
CALCIUM SERPL-MCNC: 10 MG/DL (ref 8.7–10.5)
CHLORIDE SERPL-SCNC: 103 MMOL/L (ref 95–110)
CHOLEST SERPL-MCNC: 169 MG/DL (ref 120–199)
CHOLEST/HDLC SERPL: 2.6 {RATIO} (ref 2–5)
CO2 SERPL-SCNC: 26 MMOL/L (ref 23–29)
CREAT SERPL-MCNC: 0.8 MG/DL (ref 0.5–1.4)
DIFFERENTIAL METHOD: ABNORMAL
EOSINOPHIL # BLD AUTO: 0.1 K/UL (ref 0–0.5)
EOSINOPHIL NFR BLD: 1.9 % (ref 0–8)
ERYTHROCYTE [DISTWIDTH] IN BLOOD BY AUTOMATED COUNT: 12.2 % (ref 11.5–14.5)
EST. GFR  (NO RACE VARIABLE): >60 ML/MIN/1.73 M^2
ESTIMATED AVG GLUCOSE: 82 MG/DL (ref 68–131)
ESTRADIOL SERPL-MCNC: 34 PG/ML
GLUCOSE SERPL-MCNC: 91 MG/DL (ref 70–110)
HBA1C MFR BLD: 4.5 % (ref 4–5.6)
HCT VFR BLD AUTO: 46.2 % (ref 37–48.5)
HDLC SERPL-MCNC: 66 MG/DL (ref 40–75)
HDLC SERPL: 39.1 % (ref 20–50)
HGB BLD-MCNC: 15.3 G/DL (ref 12–16)
IMM GRANULOCYTES # BLD AUTO: 0.01 K/UL (ref 0–0.04)
IMM GRANULOCYTES NFR BLD AUTO: 0.1 % (ref 0–0.5)
LDLC SERPL CALC-MCNC: 87.6 MG/DL (ref 63–159)
LYMPHOCYTES # BLD AUTO: 1.4 K/UL (ref 1–4.8)
LYMPHOCYTES NFR BLD: 18.5 % (ref 18–48)
MCH RBC QN AUTO: 30.5 PG (ref 27–31)
MCHC RBC AUTO-ENTMCNC: 33.1 G/DL (ref 32–36)
MCV RBC AUTO: 92 FL (ref 82–98)
MONOCYTES # BLD AUTO: 0.4 K/UL (ref 0.3–1)
MONOCYTES NFR BLD: 5.4 % (ref 4–15)
NEUTROPHILS # BLD AUTO: 5.4 K/UL (ref 1.8–7.7)
NEUTROPHILS NFR BLD: 73.1 % (ref 38–73)
NONHDLC SERPL-MCNC: 103 MG/DL
NRBC BLD-RTO: 0 /100 WBC
PLATELET # BLD AUTO: 243 K/UL (ref 150–450)
PMV BLD AUTO: 9.6 FL (ref 9.2–12.9)
POTASSIUM SERPL-SCNC: 4 MMOL/L (ref 3.5–5.1)
PROT SERPL-MCNC: 7.4 G/DL (ref 6–8.4)
RBC # BLD AUTO: 5.01 M/UL (ref 4–5.4)
SODIUM SERPL-SCNC: 139 MMOL/L (ref 136–145)
TRIGL SERPL-MCNC: 77 MG/DL (ref 30–150)
WBC # BLD AUTO: 7.34 K/UL (ref 3.9–12.7)

## 2023-05-17 PROCEDURE — 80053 COMPREHEN METABOLIC PANEL: CPT | Performed by: PHYSICIAN ASSISTANT

## 2023-05-17 PROCEDURE — 99396 PREV VISIT EST AGE 40-64: CPT | Mod: S$GLB,,, | Performed by: PHYSICIAN ASSISTANT

## 2023-05-17 PROCEDURE — 85025 COMPLETE CBC W/AUTO DIFF WBC: CPT | Performed by: PHYSICIAN ASSISTANT

## 2023-05-17 PROCEDURE — 3078F PR MOST RECENT DIASTOLIC BLOOD PRESSURE < 80 MM HG: ICD-10-PCS | Mod: CPTII,S$GLB,, | Performed by: PHYSICIAN ASSISTANT

## 2023-05-17 PROCEDURE — 3074F SYST BP LT 130 MM HG: CPT | Mod: CPTII,S$GLB,, | Performed by: PHYSICIAN ASSISTANT

## 2023-05-17 PROCEDURE — 3078F DIAST BP <80 MM HG: CPT | Mod: CPTII,S$GLB,, | Performed by: PHYSICIAN ASSISTANT

## 2023-05-17 PROCEDURE — 1160F RVW MEDS BY RX/DR IN RCRD: CPT | Mod: CPTII,S$GLB,, | Performed by: PHYSICIAN ASSISTANT

## 2023-05-17 PROCEDURE — 99999 PR PBB SHADOW E&M-EST. PATIENT-LVL IV: CPT | Mod: PBBFAC,,, | Performed by: PHYSICIAN ASSISTANT

## 2023-05-17 PROCEDURE — 99396 PR PREVENTIVE VISIT,EST,40-64: ICD-10-PCS | Mod: S$GLB,,, | Performed by: PHYSICIAN ASSISTANT

## 2023-05-17 PROCEDURE — 1160F PR REVIEW ALL MEDS BY PRESCRIBER/CLIN PHARMACIST DOCUMENTED: ICD-10-PCS | Mod: CPTII,S$GLB,, | Performed by: PHYSICIAN ASSISTANT

## 2023-05-17 PROCEDURE — 99999 PR PBB SHADOW E&M-EST. PATIENT-LVL IV: ICD-10-PCS | Mod: PBBFAC,,, | Performed by: PHYSICIAN ASSISTANT

## 2023-05-17 PROCEDURE — 3008F PR BODY MASS INDEX (BMI) DOCUMENTED: ICD-10-PCS | Mod: CPTII,S$GLB,, | Performed by: PHYSICIAN ASSISTANT

## 2023-05-17 PROCEDURE — 3074F PR MOST RECENT SYSTOLIC BLOOD PRESSURE < 130 MM HG: ICD-10-PCS | Mod: CPTII,S$GLB,, | Performed by: PHYSICIAN ASSISTANT

## 2023-05-17 PROCEDURE — 1159F MED LIST DOCD IN RCRD: CPT | Mod: CPTII,S$GLB,, | Performed by: PHYSICIAN ASSISTANT

## 2023-05-17 PROCEDURE — 3008F BODY MASS INDEX DOCD: CPT | Mod: CPTII,S$GLB,, | Performed by: PHYSICIAN ASSISTANT

## 2023-05-17 PROCEDURE — 36415 COLL VENOUS BLD VENIPUNCTURE: CPT | Performed by: PHYSICIAN ASSISTANT

## 2023-05-17 PROCEDURE — 82670 ASSAY OF TOTAL ESTRADIOL: CPT | Performed by: PHYSICIAN ASSISTANT

## 2023-05-17 PROCEDURE — 80061 LIPID PANEL: CPT | Performed by: PHYSICIAN ASSISTANT

## 2023-05-17 PROCEDURE — 1159F PR MEDICATION LIST DOCUMENTED IN MEDICAL RECORD: ICD-10-PCS | Mod: CPTII,S$GLB,, | Performed by: PHYSICIAN ASSISTANT

## 2023-05-17 PROCEDURE — 83036 HEMOGLOBIN GLYCOSYLATED A1C: CPT | Performed by: PHYSICIAN ASSISTANT

## 2023-05-17 RX ORDER — LOTEPREDNOL ETABONATE 3.8 MG/G
1 GEL OPHTHALMIC 2 TIMES DAILY
COMMUNITY
Start: 2022-12-27

## 2023-05-17 NOTE — PROGRESS NOTES
Subjective:      Linda Acosta is a 48 y.o. female who presents for WWE and follow-up of hormone replacement therapy.  Positive BRCA1 mutation s/p hyst/BSO on 7/1/2022 with Dr. Mittal.  Met with Dr. Hamilton with breast surgery to discuss prophylactic bilateral mastectomy. Has decided to delay. At her last visit on 11/3/2022 with Dr. Mae, she reported that hot flashes and night sweats had resolved, but having extreme mood swings and vaginal dryness.      PLAN on 11/3/2022:  Increasing dose of Vivelle-Dot to 0.1 mg twice weekly    The patient reports that she is feeling great with the vivelle-dot 0.1mg BIW. Mood is much better. Hot flashes and night sweats have resolved. Has noticed that she feels when her blood sugars gets low. She feels foggy and shaky if does not eat protein in the morning. Does have history of gestational DM. She would like to transition all GYN care to here moving forward. Due for WWE. Noticed lesion on the right labia after hyst in 2022. She had a history of HPV previously.  Breast biopsy in 11/22/22 that was negative. She is due for screening mammogram and follow up imaging for biopsy. Delaying bilateral mastectomy for a couple more years until oldest child can drive. Will continue Breast MRI and mammogram Q6 months until then.    PCP: Cesar Zambrano MD  Routine labs: Annually with PCP  Pap smear: WWE few years ago  Mammogram: 4/2022 at [facility]  Breast MRI: 10/12/2022- right breast enhancement; 11/22/22- Right breast biopsy: benign  DEXA: Never       No visits with results within 3 Month(s) from this visit.   Latest known visit with results is:   Lab Visit on 12/30/2022   Component Date Value Ref Range Status    Hemoglobin A1C 12/30/2022 4.8  4.0 - 5.6 % Final    Estimated Avg Glucose 12/30/2022 91  68 - 131 mg/dL Final    Cholesterol 12/30/2022 191  120 - 199 mg/dL Final    Triglycerides 12/30/2022 55  30 - 150 mg/dL Final    HDL 12/30/2022 68  40 - 75 mg/dL Final     LDL Cholesterol 2022 112.0  63.0 - 159.0 mg/dL Final    HDL/Cholesterol Ratio 2022 35.6  20.0 - 50.0 % Final    Total Cholesterol/HDL Ratio 2022 2.8  2.0 - 5.0 Final    Non-HDL Cholesterol 2022 123  mg/dL Final    Lipoprotein (a) 2022 5  0 - 30 mg/dL Final    CRP, High Sensitivity 2022 <0.30  0.00 - 3.19 mg/L Final       Past Medical History:   Diagnosis Date    Abnormal Pap smear of cervix     Asthma     Chronic diarrhea     GERD (gastroesophageal reflux disease)     Irritable bowel syndrome      Past Surgical History:   Procedure Laterality Date     SECTION      COLONOSCOPY      COLPOSCOPY      HYSTERECTOMY      OOPHORECTOMY      ROBOT-ASSISTED LAPAROSCOPIC ABDOMINAL HYSTERECTOMY USING DA BALBIR XI N/A 2022    Procedure: XI ROBOTIC HYSTERECTOMY;  Surgeon: Lashell Mittal MD;  Location: [facility];  Service: OB/GYN;  Laterality: N/A;    ROBOT-ASSISTED LAPAROSCOPIC SALPINGO-OOPHORECTOMY USING DA BALBIR XI Bilateral 2022    Procedure: XI ROBOTIC SALPINGO-OOPHORECTOMY;  Surgeon: Lashell Mittal MD;  Location: [facility];  Service: OB/GYN;  Laterality: Bilateral;    TONSILLECTOMY      UPPER GASTROINTESTINAL ENDOSCOPY       Social History     Tobacco Use    Smoking status: Former     Types: Cigarettes    Smokeless tobacco: Former   Substance Use Topics    Alcohol use: Yes     Comment: Rarely now but frequent drinking in college    Drug use: Never     Family History   Problem Relation Age of Onset    Thyroid cancer Father 48        type?    BRCA 1/2 Father         BRCA2+    Other Sister         BRCA-negative    Genetic Disorder Sister         MUTYH mutation 536A>G ([id number]) heterozygous    Colon polyps Sister     Breast cancer Paternal Grandmother         50s? unilat(?)    No Known Problems Daughter     GI problems Son         undiagnosed thus far    Other Paternal Aunt         BRCA-negative    Breast cancer Paternal Uncle 75    BRCA 1/2 Paternal Uncle         BRCA2  c.5946del (p.[id number]*22), Heterozygous, DELETERIOUS    Celiac disease Other     Cancer Maternal Cousin         throat(?)    Genetic Disorder Paternal Cousin         (BRCA2?)    Esophageal cancer Neg Hx     Colon cancer Neg Hx     Cirrhosis Neg Hx     Crohn's disease Neg Hx     Hemochromatosis Neg Hx     Inflammatory bowel disease Neg Hx     Irritable bowel syndrome Neg Hx     Liver cancer Neg Hx     Liver disease Neg Hx     Rectal cancer Neg Hx     Stomach cancer Neg Hx     Ulcerative colitis Neg Hx     Pancreatic cancer Neg Hx      OB History    Para Term  AB Living   2 2           SAB IAB Ectopic Multiple Live Births                  # Outcome Date GA Lbr [place]/2nd Weight Sex Delivery Anes PTL Lv   2 Para      CS-LTranv      1 Para      CS-LTranv          Current Outpatient Medications:     albuterol (PROVENTIL/VENTOLIN HFA) 90 mcg/actuation inhaler, ProAir HFA 90 mcg/actuation aerosol inhaler, Disp: , Rfl:     azelastine (ASTELIN) 137 mcg (0.1 %) nasal spray, USE 1 SPRAY TWICE A DAY INTO EACH NOSTRIL, Disp: , Rfl:     estradioL (VIVELLE-DOT) 0.1 mg/24 hr PTSW, Place 1 patch onto the skin twice a week., Disp: 8 patch, Rfl: 11    fluconazole (DIFLUCAN) 150 MG Tab, Take by mouth as needed., Disp: , Rfl:     lisdexamfetamine (VYVANSE) 30 MG capsule, Vyvanse 30 mg capsule, Disp: , Rfl:     loratadine (CLARITIN) 10 mg tablet, Claritin, Disp: , Rfl:     LOTEMAX SM 0.38 % DrpG, Place 1 drop into both eyes 2 (two) times daily., Disp: , Rfl:     montelukast (SINGULAIR) 10 mg tablet, Take 10 mg by mouth every evening., Disp: , Rfl:     prasterone, dhea, (INTRAROSA) 6.5 mg Inst, Place 6.5 mg vaginally every evening., Disp: 30 each, Rfl: 11    XIIDRA 5 % Dpet, INSTILL 1 DROP TWICE A DAY INTO BOTH EYES, Disp: , Rfl:     Review of Systems:  General: No fever, chills, or weight loss.  Chest: No chest pain, shortness of breath, or palpitations.  Breast: No pain, masses, or nipple discharge.  Vulva: No pain, or  "itching. +lesion  Vagina: No relaxation, itching, discharge, or lesions.  Abdomen: No pain, nausea, vomiting, diarrhea, or constipation.  Urinary: No incontinence, nocturia, frequency, or dysuria.  Extremities:  No leg cramps, edema, or calf pain.  Neurologic: No headaches, dizziness, or visual changes.    Objective:     Vitals:    05/17/23 1039   BP: 102/72   Weight: 53.4 kg (117 lb 12.8 oz)   Height: 5' 9" (1.753 m)   PainSc:   5   PainLoc: Hip     Body mass index is 17.4 kg/m².      Physical Exam:   APPEARANCE: Well nourished, well developed, in no acute distress.  AFFECT: WNL, alert and oriented x 3  SKIN: No acne or hirsutism  CHEST: Good respiratory effect  ABDOMEN: Soft.  No tenderness or masses.  No hepatosplenomegaly.  No hernias.  BREASTS: Symmetrical, no skin changes or visible lesions.  No palpable masses, nipple discharge bilaterally.  PELVIC: Normal external genitalia, +2mm pedunculated flesh colored papule on the right labia Normal hair distribution.  Adequate perineal body, normal urethral meatus.  Vagina moist and well rugated without lesions or discharge. Cervix and uterus are surgically absent. Adnexa without masses or tenderness.    EXTREMITIES: No edema.    Physical Exam  Genitourinary:                 Assessment:    Encounter for gynecological examination without abnormal finding    Abnormal mammogram  -     Mammo Digital Diagnostic Bilat with Fermin; Future; Expected date: 05/17/2023    Monoallelic mutation of BRCA2 gene  -     MRI Breast w/wo Contrast, w/CAD, Bilateral; Future; Expected date: 05/17/2023    Screening, lipid  -     Lipid panel; Future; Expected date: 05/17/2023    Screening for diabetes mellitus  -     Hemoglobin A1C; Future; Expected date: 05/17/2023    Menopausal symptoms  -     Estradiol; Future; Expected date: 05/17/2023  -     Comprehensive Metabolic Panel; Future; Expected date: 05/17/2023  -     CBC Auto Differential; Future; Expected date: 05/17/2023    Screening for " osteoporosis  -     DXA Bone Density Axial Skeleton 1 or more sites; Future; Expected date: 05/17/2023    Condyloma    At high risk for breast cancer  -     MRI Breast w/wo Contrast, w/CAD, Bilateral; Future; Expected date: 05/17/2023      Plan:   Schedule bilateral diagnostic mammogram- due for screening and 6 month follow up from breast biopsy.  Breast MRI due 11/2023  CBE Q6 months, she will follow up with breast surgery in 6 months for this.  Continue Vivelle Dot 0.1mg BIW.  Routine screening labs and estradiol level today.  Encouraged lean protein 3x per day and maintain hydration.  Follow up with PCP annually  TCA applied to condyloma, tolerated well.  Follow up in 2 weeks PRN.  Follow up annually or WWE or sooner PRN.    Instructed patient to call if she experiences any side effects or has any questions.    I spent a total of 45 minutes on the day of the visit.This includes face to face time and non-face to face time preparing to see the patient (eg, review of tests), obtaining and/or reviewing separately obtained history, documenting clinical information in the electronic or other health record, independently interpreting results and communicating results to the patient/family/caregiver, or care coordinator.

## 2023-05-18 ENCOUNTER — PATIENT MESSAGE (OUTPATIENT)
Dept: OBSTETRICS AND GYNECOLOGY | Facility: CLINIC | Age: 48
End: 2023-05-18
Payer: COMMERCIAL

## 2023-06-15 ENCOUNTER — PATIENT MESSAGE (OUTPATIENT)
Dept: HEMATOLOGY/ONCOLOGY | Facility: CLINIC | Age: 48
End: 2023-06-15
Payer: COMMERCIAL

## 2023-06-21 ENCOUNTER — TELEPHONE (OUTPATIENT)
Dept: HEMATOLOGY/ONCOLOGY | Facility: CLINIC | Age: 48
End: 2023-06-21
Payer: COMMERCIAL

## 2023-06-21 NOTE — TELEPHONE ENCOUNTER
Called patient regarding scheduling the Galleri testing. LVM to call or reach out via [employer] which ever most convenient for her. Left phone and sending mychart with additional information. Awaiting call back or follow up to schedule lab per pt's request.

## 2023-07-21 ENCOUNTER — HOSPITAL ENCOUNTER (OUTPATIENT)
Dept: RADIOLOGY | Facility: OTHER | Age: 48
Discharge: HOME | End: 2023-07-21
Attending: PHYSICIAN ASSISTANT
Payer: COMMERCIAL

## 2023-07-21 DIAGNOSIS — R92.8 ABNORMAL MAMMOGRAM: ICD-10-CM

## 2023-07-21 PROCEDURE — 77066 MAMMO DIGITAL DIAGNOSTIC BILAT WITH TOMO: ICD-10-PCS | Mod: 26,,, | Performed by: RADIOLOGY

## 2023-07-21 PROCEDURE — 77062 BREAST TOMOSYNTHESIS BI: CPT | Mod: 26,,, | Performed by: RADIOLOGY

## 2023-07-21 PROCEDURE — 77066 DX MAMMO INCL CAD BI: CPT | Mod: TC

## 2023-07-21 PROCEDURE — 77062 MAMMO DIGITAL DIAGNOSTIC BILAT WITH TOMO: ICD-10-PCS | Mod: 26,,, | Performed by: RADIOLOGY

## 2023-07-21 PROCEDURE — 77066 DX MAMMO INCL CAD BI: CPT | Mod: 26,,, | Performed by: RADIOLOGY

## 2023-10-08 RX ORDER — ESTRADIOL 0.1 MG/D
1 FILM, EXTENDED RELEASE TRANSDERMAL
Qty: 8 PATCH | Refills: 11 | Status: SHIPPED | OUTPATIENT
Start: 2023-10-09

## 2024-01-25 ENCOUNTER — TELEPHONE (OUTPATIENT)
Dept: HEMATOLOGY/ONCOLOGY | Facility: CLINIC | Age: 49
End: 2024-01-25
Payer: COMMERCIAL

## 2024-01-25 NOTE — TELEPHONE ENCOUNTER
----- Message from Ammy Lomeli PA-C sent at 1/25/2024  3:29 PM CST -----  She is due for breast MRI now. Thank you!    ----- Message -----  From: Priyank Mora  Sent: 1/25/2024   3:01 PM CST  To: Ammy Lomeli PA-C    Hi Ammy,    Can you tell me the next time this pt is due for a MRI please?                Thank you!!  Priyank

## 2024-02-15 ENCOUNTER — HOSPITAL ENCOUNTER (OUTPATIENT)
Dept: RADIOLOGY | Facility: HOSPITAL | Age: 49
Discharge: HOME | End: 2024-02-15
Attending: PHYSICIAN ASSISTANT
Payer: COMMERCIAL

## 2024-02-15 DIAGNOSIS — Z15.01 MONOALLELIC MUTATION OF BRCA2 GENE: ICD-10-CM

## 2024-02-15 DIAGNOSIS — Z91.89 AT HIGH RISK FOR BREAST CANCER: ICD-10-CM

## 2024-02-15 DIAGNOSIS — Z15.09 MONOALLELIC MUTATION OF BRCA2 GENE: ICD-10-CM

## 2024-02-15 PROCEDURE — 25500020 PHARM REV CODE 255: Performed by: PHYSICIAN ASSISTANT

## 2024-02-15 PROCEDURE — 77049 MRI BREAST C-+ W/CAD BI: CPT | Mod: 26,,, | Performed by: RADIOLOGY

## 2024-02-15 PROCEDURE — 77049 MRI BREAST C-+ W/CAD BI: CPT | Mod: TC

## 2024-02-15 PROCEDURE — A9577 INJ MULTIHANCE: HCPCS | Performed by: PHYSICIAN ASSISTANT

## 2024-02-15 RX ADMIN — GADOBENATE DIMEGLUMINE 11 ML: 529 INJECTION, SOLUTION INTRAVENOUS at 04:02

## 2024-04-26 ENCOUNTER — OFFICE VISIT (OUTPATIENT)
Dept: SURGERY | Facility: CLINIC | Age: 49
End: 2024-04-26
Payer: COMMERCIAL

## 2024-04-26 VITALS
BODY MASS INDEX: 16.88 KG/M2 | DIASTOLIC BLOOD PRESSURE: 66 MMHG | HEIGHT: 69 IN | WEIGHT: 114 LBS | SYSTOLIC BLOOD PRESSURE: 105 MMHG | HEART RATE: 74 BPM

## 2024-04-26 DIAGNOSIS — Z12.31 ENCOUNTER FOR SCREENING MAMMOGRAM FOR MALIGNANT NEOPLASM OF BREAST: ICD-10-CM

## 2024-04-26 DIAGNOSIS — Z09 FOLLOW-UP EXAM: ICD-10-CM

## 2024-04-26 DIAGNOSIS — Z15.09 MONOALLELIC MUTATION OF BRCA2 GENE: Primary | ICD-10-CM

## 2024-04-26 DIAGNOSIS — Z12.39 ENCOUNTER FOR SCREENING BREAST EXAMINATION: ICD-10-CM

## 2024-04-26 DIAGNOSIS — Z15.01 MONOALLELIC MUTATION OF BRCA2 GENE: Primary | ICD-10-CM

## 2024-04-26 PROCEDURE — 3074F SYST BP LT 130 MM HG: CPT | Mod: CPTII,S$GLB,, | Performed by: NURSE PRACTITIONER

## 2024-04-26 PROCEDURE — 3078F DIAST BP <80 MM HG: CPT | Mod: CPTII,S$GLB,, | Performed by: NURSE PRACTITIONER

## 2024-04-26 PROCEDURE — 99999 PR PBB SHADOW E&M-EST. PATIENT-LVL IV: CPT | Mod: PBBFAC,,, | Performed by: NURSE PRACTITIONER

## 2024-04-26 PROCEDURE — 3008F BODY MASS INDEX DOCD: CPT | Mod: CPTII,S$GLB,, | Performed by: NURSE PRACTITIONER

## 2024-04-26 PROCEDURE — 1159F MED LIST DOCD IN RCRD: CPT | Mod: CPTII,S$GLB,, | Performed by: NURSE PRACTITIONER

## 2024-04-26 PROCEDURE — 99215 OFFICE O/P EST HI 40 MIN: CPT | Mod: S$GLB,,, | Performed by: NURSE PRACTITIONER

## 2024-04-26 NOTE — PROGRESS NOTES
[facility]  Department of Surgery  HIGH RISK      Referring provider:    No referring provider defined for this encounter.    PCP:  No, Primary Doctor    HIGH RISK    Subjective:     Linda Acosta is a 48 y.o. premenopausal female referred for evaluation of increased risk of breast cancer based on +BRCA2 mutation diagnosed 3/2022. Here today to discussed options of high risk screening and risk reduction.    Underwent Robotic hysterectomy and salpingo-oophorectomy 2022 with Dr. Mittal.     Underwent breast MRI biopsy of the right breast 2022 which was benign.     Gynecologic History:  Age of menarche was 15  Last menstrual period was 2022.  Age of menopause was N/A.      Patient denies hormonal therapy.   Patient is .    Age of first live birth was 32.    Patient did breast feed.     She presents for follow up and ready to start planning surgery. Underwent breast MRI which was benign. She is due for screening mammogram in July.     Medical History is significant for the following:  Past Medical History:   Diagnosis Date    Abnormal Pap smear of cervix     Asthma     Chronic diarrhea     GERD (gastroesophageal reflux disease)     Irritable bowel syndrome        Family History is significant for the following:  Family History   Problem Relation Name Age of Onset    Thyroid cancer Father Sachin 48        type?    BRCA 1/2 Father Sachin         BRCA2+    Other Sister Lexie         BRCA-negative    Genetic Disorder Sister Lexie         MUTYH mutation 536A>G ([id number]) heterozygous    Colon polyps Sister Lexie     Breast cancer Paternal Grandmother PGM         50s? unilat(?)    No Known Problems Daughter Lincoln     GI problems Son Juan C         undiagnosed thus far    Other Paternal Aunt          BRCA-negative    Breast cancer Paternal Uncle Singh 75    BRCA 1/2 Paternal Uncle Singh         BRCA2 c.5946del (p.[id number]*22), Heterozygous, DELETERIOUS    Celiac disease Other  "Sara     Cancer Maternal Cousin x1         throat(?)    Genetic Disorder Paternal Cousin Jose         (BRCA2?)    Esophageal cancer Neg Hx      Colon cancer Neg Hx      Cirrhosis Neg Hx      Crohn's disease Neg Hx      Hemochromatosis Neg Hx      Inflammatory bowel disease Neg Hx      Irritable bowel syndrome Neg Hx      Liver cancer Neg Hx      Liver disease Neg Hx      Rectal cancer Neg Hx      Stomach cancer Neg Hx      Ulcerative colitis Neg Hx      Pancreatic cancer Neg Hx         Social History:   Social History     Socioeconomic History    Marital status:    Tobacco Use    Smoking status: Former     Types: Cigarettes    Smokeless tobacco: Former   Substance and Sexual Activity    Alcohol use: Yes     Comment: Rarely now but frequent drinking in college    Drug use: Never    Sexual activity: Yes     Partners: Male   Social History Narrative    She was  once for 6 years .    She is currently single but engaged to be .    She has 2 healthy children born in 2008 and 2012.    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       Review of Systems  Review of Systems   Constitutional:  Negative for chills, fever and weight loss.   Eyes:  Negative for blurred vision, double vision, pain, discharge and redness.   Respiratory:  Negative for cough and wheezing.    Cardiovascular:  Negative for chest pain and leg swelling.   Gastrointestinal:  Negative for blood in stool.   Musculoskeletal:  Negative for back pain, falls and neck pain.   Skin:  Negative for rash.   Neurological:  Negative for weakness and headaches.          Objective:   /66 (BP Location: Left arm, Patient Position: Sitting, BP Method: Medium (Automatic))   Pulse 74   Ht 5' 9" (1.753 m)   Wt 51.7 kg (114 lb)   LMP 06/20/2022 (Exact Date)   BMI 16.83 kg/m²     Physical Exam   Constitutional: She is oriented to person, place, and time. She appears well-developed.   HENT:   Head: Normocephalic and atraumatic. "   Eyes: Pupils are equal, round, and reactive to light. Right eye exhibits no discharge. Left eye exhibits no discharge.   Cardiovascular:  Normal rate, regular rhythm and normal heart sounds.            Pulmonary/Chest: Effort normal and breath sounds normal. No respiratory distress. She has no wheezes. Right breast exhibits no inverted nipple, no mass, no nipple discharge, no skin change and no tenderness. Left breast exhibits no inverted nipple, no mass, no nipple discharge, no skin change and no tenderness.   Musculoskeletal: Normal range of motion. Lymphadenopathy:      Cervical: No cervical adenopathy.     Neurological: She is alert and oriented to person, place, and time.   Skin: Skin is warm and dry. No erythema.           Assessment:     This is a 48 y.o. female with an increased risk of breast cancer based on BRCA2 mutation.       Plan:   Given her BRCA mutation status, her lifetime risk of breast cancer is estimated to be 60-80% and her risk of ovarian cancer 15-30%.  We had a discussion regarding risk reduction strategies including active surveillance or prophylactic mastectomy for breast cancer risk and oral contraceptives or risk reducing salpingo-oophorectomy  to reduce risk of ovarian cancer.       Specifically with respect to breast cancer, we discussed timing of these potential interventions, as well as the procedure of bilateral prophylactic mastectomy and reconstructive options.     Discussed risk models can vary based on the model used.  There are several models available and we currently use TC model for our patients with family history.  Based on this, the patient's risk exceeds 20%. Patients with lifetime risk over 20% qualify for increased screening with MRI, in addition to mammograms.  We also would perform clinical breast exams every 6 months.  Discussed pros and cons of MRI screening.    We also discussed risk reduction options such as a healthy diet including fresh fruits, green leafy  vegetables and lean meats. Avoidance of processed foods.    Exercise I recommend at least 30 minutes of cardiovascular exercise 4-5 times per week, even walking would have benefit.  Discussed that exercise can lower the relative risk of breast cancer by about 18-20%.  Also discussed that obesity is linked to higher risk of breast cancer, therefore exercise in important.    Discussed alcohol use and some studies suggest that increase alcohol intake may increase breast cancer risk.  Therefore, I do recommend limited alcohol intake.    Also discussed risk factors that are not modifiable, such as age at menarche, age at menopause, age at first pregnancy, and family history.     We did discuss hormone replacement therapy as well.  In patients at increased risk, I usually do not recommend HRT for long periods of time.      Discussed briefly risk reduction options with chemoprevention, such as Tamoxifen or Raloxifene.  These have been shown to lower the risk of breast cancer incidence, however have not been shown to improve survival in patients who do not have a breast cancer.    Patient is ready to proceed with surgery. Will have her meet with plastic surgery to discuss reconstructive options.  After meeting with plastics will coordinate surgery date  She will meet back with Dr. Hamilton prior to surgery to finalize surgical plan.  Due for screening mammogram July 2024  MRI will be due 2/2025     The patient is in agreement with the plan. Questions were encouraged and answered to patient's satisfaction. Linda will call our office with any questions or concerns.

## 2024-05-03 ENCOUNTER — PATIENT MESSAGE (OUTPATIENT)
Dept: PLASTIC SURGERY | Facility: CLINIC | Age: 49
End: 2024-05-03
Payer: COMMERCIAL

## 2024-06-18 RX ORDER — ESTRADIOL 0.1 MG/D
1 FILM, EXTENDED RELEASE TRANSDERMAL
Qty: 8 PATCH | Refills: 1 | Status: SHIPPED | OUTPATIENT
Start: 2024-06-20

## 2024-06-18 RX ORDER — PRASTERONE 6.5 MG/1
6.5 INSERT VAGINAL NIGHTLY
Qty: 30 EACH | Refills: 1 | Status: SHIPPED | OUTPATIENT
Start: 2024-06-18

## 2024-06-18 NOTE — TELEPHONE ENCOUNTER
Pt requesting refill on intrarosa. Pt last seen Ammy LEGGETT on 5/17/23. Pt is due for appointment

## 2024-07-29 ENCOUNTER — PATIENT MESSAGE (OUTPATIENT)
Dept: PLASTIC SURGERY | Facility: CLINIC | Age: 49
End: 2024-07-29
Payer: COMMERCIAL

## 2024-07-29 ENCOUNTER — TELEPHONE (OUTPATIENT)
Dept: PLASTIC SURGERY | Facility: CLINIC | Age: 49
End: 2024-07-29
Payer: COMMERCIAL

## 2024-07-29 NOTE — TELEPHONE ENCOUNTER
Called pt to schedule consult w Dr Desai - she stated she will call in the future if need to - was in a meeting and asked to send information from Dr Desai's office on how to reach back out.

## 2024-09-17 RX ORDER — ESTRADIOL 0.1 MG/D
1 FILM, EXTENDED RELEASE TRANSDERMAL
Qty: 8 PATCH | Refills: 0 | Status: SHIPPED | OUTPATIENT
Start: 2024-09-19

## 2024-09-20 ENCOUNTER — TELEPHONE (OUTPATIENT)
Dept: OBSTETRICS AND GYNECOLOGY | Facility: CLINIC | Age: 49
End: 2024-09-20
Payer: COMMERCIAL

## 2024-09-20 NOTE — TELEPHONE ENCOUNTER
----- Message from Ze Negrete sent at 9/20/2024  1:44 PM CDT -----  Regarding: Self [phone]  Type: Patient Call Back    Who called: Self     What is the request in detail: called in regards to following up on messages left on the portal. Offered an appt for next week with OLEKSANDR Romano, but declined due to being out of town and doesn't want to wait till November for the next after that. Would like a call back.     Can the clinic reply by [facility]? No     Would the patient rather a call back or a response via [facility]? Call back     Best call back number: [phone]     Additional Information:    Thank you.

## 2024-09-20 NOTE — TELEPHONE ENCOUNTER
Pt called to see if she could get a sooner appointment then February. Offered OLEKSANDR Gimenez in November. Pt scheduled with PA. Will refill meds to appointment

## 2024-10-16 RX ORDER — ESTRADIOL 0.1 MG/D
1 FILM, EXTENDED RELEASE TRANSDERMAL
Qty: 8 PATCH | Refills: 0 | Status: SHIPPED | OUTPATIENT
Start: 2024-10-17

## 2024-11-01 ENCOUNTER — PATIENT MESSAGE (OUTPATIENT)
Dept: OBSTETRICS AND GYNECOLOGY | Facility: CLINIC | Age: 49
End: 2024-11-01
Payer: COMMERCIAL

## 2024-11-19 ENCOUNTER — OFFICE VISIT (OUTPATIENT)
Dept: OBSTETRICS AND GYNECOLOGY | Facility: CLINIC | Age: 49
End: 2024-11-19
Attending: OBSTETRICS & GYNECOLOGY
Payer: COMMERCIAL

## 2024-11-19 VITALS
BODY MASS INDEX: 17.72 KG/M2 | HEIGHT: 69 IN | DIASTOLIC BLOOD PRESSURE: 68 MMHG | HEART RATE: 82 BPM | SYSTOLIC BLOOD PRESSURE: 101 MMHG | WEIGHT: 119.63 LBS

## 2024-11-19 DIAGNOSIS — Z12.39 BREAST CANCER SCREENING, HIGH RISK PATIENT: ICD-10-CM

## 2024-11-19 DIAGNOSIS — N95.2 VAGINAL ATROPHY: ICD-10-CM

## 2024-11-19 DIAGNOSIS — Z15.01 MONOALLELIC MUTATION OF BRCA2 GENE: ICD-10-CM

## 2024-11-19 DIAGNOSIS — N95.1 MENOPAUSAL SYMPTOMS: ICD-10-CM

## 2024-11-19 DIAGNOSIS — Z91.89 INCREASED RISK OF BREAST CANCER: ICD-10-CM

## 2024-11-19 DIAGNOSIS — R22.31 AXILLARY MASS, RIGHT: ICD-10-CM

## 2024-11-19 DIAGNOSIS — Z01.411 ENCOUNTER FOR GYNECOLOGICAL EXAMINATION WITH ABNORMAL FINDING: Primary | ICD-10-CM

## 2024-11-19 DIAGNOSIS — Z15.09 MONOALLELIC MUTATION OF BRCA2 GENE: ICD-10-CM

## 2024-11-19 PROCEDURE — 99999 PR PBB SHADOW E&M-EST. PATIENT-LVL IV: CPT | Mod: PBBFAC,,, | Performed by: PHYSICIAN ASSISTANT

## 2024-11-19 RX ORDER — PRASTERONE 6.5 MG/1
6.5 INSERT VAGINAL NIGHTLY
Qty: 30 EACH | Refills: 11 | Status: SHIPPED | OUTPATIENT
Start: 2024-11-19

## 2024-11-19 RX ORDER — ESTRADIOL 0.1 MG/D
1 FILM, EXTENDED RELEASE TRANSDERMAL
Qty: 24 PATCH | Refills: 3 | Status: SHIPPED | OUTPATIENT
Start: 2024-11-21

## 2024-11-19 RX ORDER — PROGESTERONE 100 MG/1
CAPSULE ORAL
COMMUNITY
Start: 2024-11-04 | End: 2024-11-19

## 2024-11-19 NOTE — PROGRESS NOTES
CC: Well woman exam    Linda Acosta is a 49 y.o. female  presents for well woman exam.  LMP: Patient's last menstrual period was 2022 (exact date). Positive BRCA2 mutation s/p hyst/BSO on 2022 with Dr. Mittal. Seeing breast surgery and starting to plan for prophylactic bilateral mastectomy. She is on HRT and wishes to continue.  Reports mass in the right axilla x 1 month. Sometimes tender. No recent vaccines or other infections.   She does report insomnia and fatigue that is worse in the last year.  has noticed her screaming in her sleep. She has been given trazodone and progesterone 100mg Qhs by an outside provider. She started the progesterone about 1.5 months ago. She is active throughout the day.     Current tx:  Vivelle-Dot to 0.1 mg twice weekly   Intrarosa nightly    Mammogram: 2024- negative; Breast MRI 2/15/2024- negative  Pap: s/p hyst  PCP: Sara Mar MD  Routine Screening Labs: 2023  Colonoscopy:   DEXA: 2023    Past Medical History:   Diagnosis Date    Abnormal Pap smear of cervix     Asthma     BRCA2 positive     Chronic diarrhea     GERD (gastroesophageal reflux disease)     Irritable bowel syndrome      Past Surgical History:   Procedure Laterality Date    BREAST BIOPSY Right     corebx negative     SECTION      COLONOSCOPY      COLPOSCOPY      HYSTERECTOMY      OOPHORECTOMY      ROBOT-ASSISTED LAPAROSCOPIC ABDOMINAL HYSTERECTOMY USING DA BALBIR XI N/A 2022    Procedure: XI ROBOTIC HYSTERECTOMY;  Surgeon: Lashell Mittal MD;  Location: [facility];  Service: OB/GYN;  Laterality: N/A;    ROBOT-ASSISTED LAPAROSCOPIC SALPINGO-OOPHORECTOMY USING DA BALBIR XI Bilateral 2022    Procedure: XI ROBOTIC SALPINGO-OOPHORECTOMY;  Surgeon: Lashell Mittal MD;  Location: [facility];  Service: OB/GYN;  Laterality: Bilateral;    TONSILLECTOMY      UPPER GASTROINTESTINAL ENDOSCOPY       Social History     Socioeconomic History    Marital status:     Tobacco Use    Smoking status: Former     Types: Cigarettes    Smokeless tobacco: Former   Substance and Sexual Activity    Alcohol use: Yes     Comment: Rarely now but frequent drinking in college    Drug use: Never    Sexual activity: Yes     Partners: Male   Social History Narrative    She was  once for 6 years .    She is currently single but engaged to be .    She has 2 healthy children born in  and .    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     Family History   Problem Relation Name Age of Onset    Thyroid cancer Father Sachin 48        type?    BRCA 1/2 Father Sachin         BRCA2+    Other Sister Lexie         BRCA-negative    Genetic Disorder Sister Lexie         MUTYH mutation 536A>G ([id number]) heterozygous    Colon polyps Sister Lexie     Breast cancer Paternal Grandmother PGM         50s? unilat(?)    No Known Problems Daughter Mj     GI problems Son Juan C         undiagnosed thus far    Other Paternal Aunt Lory         BRCA-negative    Breast cancer Paternal Uncle Singh 75    BRCA 1/2 Paternal Uncle Singh         BRCA2 c.5946del (p.[id number]*22), Heterozygous, DELETERIOUS    Celiac disease Other Sara     Cancer Maternal Cousin x1         throat(?)    Genetic Disorder Paternal Cousin Jose         (BRCA2?)    Esophageal cancer Neg Hx      Colon cancer Neg Hx      Cirrhosis Neg Hx      Crohn's disease Neg Hx      Hemochromatosis Neg Hx      Inflammatory bowel disease Neg Hx      Irritable bowel syndrome Neg Hx      Liver cancer Neg Hx      Liver disease Neg Hx      Rectal cancer Neg Hx      Stomach cancer Neg Hx      Ulcerative colitis Neg Hx      Pancreatic cancer Neg Hx       OB History          4    Para   4    Term   2            AB        Living             SAB        IAB        Ectopic        Multiple        Live Births                     Current Outpatient Medications:     trazodone HCl (TRAZODONE, BULK, MISC), ,  "Disp: , Rfl:     albuterol (PROVENTIL/VENTOLIN HFA) 90 mcg/actuation inhaler, ProAir HFA 90 mcg/actuation aerosol inhaler, Disp: , Rfl:     azelastine (ASTELIN) 137 mcg (0.1 %) nasal spray, USE 1 SPRAY TWICE A DAY INTO EACH NOSTRIL, Disp: , Rfl:     [START ON 11/21/2024] estradioL (VIVELLE-DOT) 0.1 mg/24 hr PTSW, Place 1 patch onto the skin twice a week., Disp: 24 patch, Rfl: 3    fluconazole (DIFLUCAN) 150 MG Tab, Take by mouth as needed., Disp: , Rfl:     lisdexamfetamine (VYVANSE) 30 MG capsule, Vyvanse 30 mg capsule, Disp: , Rfl:     loratadine (CLARITIN) 10 mg tablet, Claritin, Disp: , Rfl:     LOTEMAX SM 0.38 % DrpG, Place 1 drop into both eyes 2 (two) times daily., Disp: , Rfl:     montelukast (SINGULAIR) 10 mg tablet, Take 10 mg by mouth every evening., Disp: , Rfl:     prasterone, dhea, (INTRAROSA) 6.5 mg Inst, Place 6.5 mg vaginally every evening., Disp: 30 each, Rfl: 11    XIIDRA 5 % Dpet, INSTILL 1 DROP TWICE A DAY INTO BOTH EYES, Disp: , Rfl:     The 10-year ASCVD risk score (Thomas SAINZ, et al., 2019) is: 0.4%    Values used to calculate the score:      Age: 49 years      Sex: Female      Is Non- : No      Diabetic: No      Tobacco smoker: No      Systolic Blood Pressure: 101 mmHg      Is BP treated: No      HDL Cholesterol: 66 mg/dL      Total Cholesterol: 169 mg/dL    /68 (BP Location: Left arm, Patient Position: Sitting)   Pulse 82   Ht 5' 9" (1.753 m)   Wt 54.3 kg (119 lb 9.6 oz)   LMP 06/20/2022 (Exact Date)   BMI 17.66 kg/m²       ROS:  GENERAL: Denies weight gain or weight loss. +fatigue +insomnia  SKIN: Denies rash or lesions.   HEAD: Denies head injury or headache.   NODES: Denies enlarged lymph nodes.   CHEST: Denies chest pain or shortness of breath.   CARDIOVASCULAR: Denies palpitations or left sided chest pain.   ABDOMEN: No abdominal pain, constipation, diarrhea, nausea, vomiting or rectal bleeding.   URINARY: No frequency, dysuria, hematuria, or burning " on urination.  REPRODUCTIVE: See HPI.   BREASTS: Denies pain, lumps, or nipple discharge. +right axillary mass  HEMATOLOGIC: No easy bruisability or excessive bleeding.   MUSCULOSKELETAL: Denies joint pain or swelling.   NEUROLOGIC: Denies syncope or weakness.   PSYCHIATRIC: Denies depression, anxiety or mood swings.    PHYSICAL EXAM:  APPEARANCE: Well nourished, well developed, in no acute distress.  AFFECT: WNL, alert and oriented x 3  CHEST: Good respiratory effect  ABDOMEN: Soft.  No tenderness or masses.  No hepatosplenomegaly.  No hernias.  BREASTS: Symmetrical, no skin changes or visible lesions.  No palpable masses, nipple discharge bilaterally. +mass in the right axilla  PELVIC: Normal external genitalia without lesions.  Normal hair distribution.  Adequate perineal body, normal urethral meatus.  Vagina moist and well rugated without lesions or discharge. Cervix and uterus are surgically absent. Adnexa without masses or tenderness.    EXTREMITIES: No edema.    ASSESSMENT:   Encounter for gynecological examination with abnormal finding    Monoallelic mutation of BRCA2 gene  -     MRI Breast w/wo Contrast, w/CAD, Bilateral; Future; Expected date: 11/19/2024    Menopausal symptoms  -     estradioL (VIVELLE-DOT) 0.1 mg/24 hr PTSW; Place 1 patch onto the skin twice a week.  Dispense: 24 patch; Refill: 3    Axillary mass, right  -     Mammo Digital Diagnostic Right with Fermin; Future; Expected date: 11/19/2024  -     US Breast Right Limited; Future; Expected date: 11/19/2024    Breast cancer screening, high risk patient  -     MRI Breast w/wo Contrast, w/CAD, Bilateral; Future; Expected date: 11/19/2024    Increased risk of breast cancer  -     MRI Breast w/wo Contrast, w/CAD, Bilateral; Future; Expected date: 11/19/2024    Vaginal atrophy  -     prasterone, dhea, (INTRAROSA) 6.5 mg Inst; Place 6.5 mg vaginally every evening.  Dispense: 30 each; Refill: 11          PLAN:   Schedule right diagnostic mammogram and  US if needed for right axillary mass  Breast MRI in 1/2025  Screening mammogram due 7/2025  Continue working with Breast surgery to schedule prophylactic bilateral mastectomy when ready  Continue vivelle dot 0.1mg BIW  Continue Intrarosa  Do not have as clear safety data on progesterone with BRCA2 mutation prior to mastectomy. Recommend discontinuing progesterone for now.  Start trazodone 50mg QHS that she has at home  Add mg glycinate 400-500mg Qhs  We did briefly discuss pros and cons of testosterone therapy if needed in the future.  Consider follow up with PCP for sleep study.  Follow up if symptoms are not improving  Follow up annually or PRN    Patient was counseled today on A.C.S. Pap guidelines and recommendations for yearly pelvic exams, mammograms and monthly self breast exams; to see her PCP for other health maintenance.

## 2024-11-20 ENCOUNTER — PATIENT MESSAGE (OUTPATIENT)
Dept: SURGERY | Facility: CLINIC | Age: 49
End: 2024-11-20
Payer: COMMERCIAL

## 2024-11-26 ENCOUNTER — HOSPITAL ENCOUNTER (OUTPATIENT)
Dept: RADIOLOGY | Facility: HOSPITAL | Age: 49
Discharge: HOME | End: 2024-11-26
Attending: PHYSICIAN ASSISTANT
Payer: COMMERCIAL

## 2024-11-26 DIAGNOSIS — R22.31 AXILLARY MASS, RIGHT: ICD-10-CM

## 2024-11-26 PROCEDURE — 76642 ULTRASOUND BREAST LIMITED: CPT | Mod: 26,RT,, | Performed by: RADIOLOGY

## 2024-11-26 PROCEDURE — 76642 ULTRASOUND BREAST LIMITED: CPT | Mod: TC,RT

## 2025-02-26 ENCOUNTER — HOSPITAL ENCOUNTER (OUTPATIENT)
Dept: RADIOLOGY | Facility: HOSPITAL | Age: 50
Discharge: HOME | End: 2025-02-26
Attending: PHYSICIAN ASSISTANT
Payer: COMMERCIAL

## 2025-02-26 DIAGNOSIS — Z15.09 MONOALLELIC MUTATION OF BRCA2 GENE: ICD-10-CM

## 2025-02-26 DIAGNOSIS — Z91.89 INCREASED RISK OF BREAST CANCER: ICD-10-CM

## 2025-02-26 DIAGNOSIS — Z12.39 BREAST CANCER SCREENING, HIGH RISK PATIENT: ICD-10-CM

## 2025-02-26 DIAGNOSIS — Z15.01 MONOALLELIC MUTATION OF BRCA2 GENE: ICD-10-CM

## 2025-02-26 PROCEDURE — A9577 INJ MULTIHANCE: HCPCS | Performed by: PHYSICIAN ASSISTANT

## 2025-02-26 PROCEDURE — 25500020 PHARM REV CODE 255: Performed by: PHYSICIAN ASSISTANT

## 2025-02-26 PROCEDURE — 77049 MRI BREAST C-+ W/CAD BI: CPT | Mod: TC

## 2025-02-26 RX ADMIN — GADOBENATE DIMEGLUMINE 12 ML: 529 INJECTION, SOLUTION INTRAVENOUS at 05:02

## 2025-02-28 ENCOUNTER — TELEPHONE (OUTPATIENT)
Dept: RADIOLOGY | Facility: HOSPITAL | Age: 50
End: 2025-02-28
Payer: COMMERCIAL

## 2025-02-28 NOTE — TELEPHONE ENCOUNTER
How Severe Is Your Skin Lesion?: mild Spoke with patient. Reviewed MRI breast biopsy procedure and reviewed instructions for MRI breast biopsy. Patient expressed understanding and all questions were answered. Provided patient with my phone number to call for any further concerns or questions.   Patient scheduled MRI breast biopsy at [facility] for 3/6/2025.    Has Your Skin Lesion Been Treated?: not been treated Is This A New Presentation, Or A Follow-Up?: Skin Lesions

## 2025-03-03 ENCOUNTER — RESULTS FOLLOW-UP (OUTPATIENT)
Dept: OBSTETRICS AND GYNECOLOGY | Facility: CLINIC | Age: 50
End: 2025-03-03

## 2025-03-06 ENCOUNTER — HOSPITAL ENCOUNTER (OUTPATIENT)
Dept: RADIOLOGY | Facility: OTHER | Age: 50
Discharge: HOME | End: 2025-03-06
Attending: PHYSICIAN ASSISTANT
Payer: COMMERCIAL

## 2025-03-06 DIAGNOSIS — R92.8 ABNORMAL FINDING ON BREAST IMAGING: ICD-10-CM

## 2025-03-06 PROCEDURE — A9577 INJ MULTIHANCE: HCPCS | Performed by: PHYSICIAN ASSISTANT

## 2025-03-06 PROCEDURE — 25500020 PHARM REV CODE 255: Performed by: PHYSICIAN ASSISTANT

## 2025-03-06 PROCEDURE — 19085 BX BREAST 1ST LESION MR IMAG: CPT | Mod: LT,,, | Performed by: RADIOLOGY

## 2025-03-06 PROCEDURE — 19085 BX BREAST 1ST LESION MR IMAG: CPT | Mod: TC

## 2025-03-06 RX ORDER — LIDOCAINE HYDROCHLORIDE 10 MG/ML
5 INJECTION, SOLUTION INFILTRATION; PERINEURAL ONCE
Status: DISCONTINUED | OUTPATIENT
Start: 2025-03-06 | End: 2025-03-06

## 2025-03-06 RX ORDER — LIDOCAINE HYDROCHLORIDE 10 MG/ML
1 INJECTION, SOLUTION INFILTRATION; PERINEURAL ONCE
Status: DISCONTINUED | OUTPATIENT
Start: 2025-03-06 | End: 2025-03-06 | Stop reason: CLARIF

## 2025-03-06 RX ORDER — LIDOCAINE HYDROCHLORIDE AND EPINEPHRINE 10; 20 UG/ML; MG/ML
1 INJECTION, SOLUTION INFILTRATION; PERINEURAL ONCE
Status: DISCONTINUED | OUTPATIENT
Start: 2025-03-06 | End: 2025-03-06 | Stop reason: CLARIF

## 2025-03-06 RX ORDER — LIDOCAINE HYDROCHLORIDE AND EPINEPHRINE 10; 20 UG/ML; MG/ML
20 INJECTION, SOLUTION INFILTRATION; PERINEURAL ONCE
Status: DISCONTINUED | OUTPATIENT
Start: 2025-03-06 | End: 2025-03-06

## 2025-03-06 RX ADMIN — GADOBENATE DIMEGLUMINE 10 ML: 529 INJECTION, SOLUTION INTRAVENOUS at 03:03

## 2025-03-18 ENCOUNTER — HOSPITAL ENCOUNTER (OUTPATIENT)
Dept: RADIOLOGY | Facility: HOSPITAL | Age: 50
Discharge: HOME | End: 2025-03-18
Attending: PHYSICIAN ASSISTANT
Payer: COMMERCIAL

## 2025-03-18 DIAGNOSIS — R92.8 ABNORMAL FINDING ON BREAST IMAGING: ICD-10-CM

## 2025-03-18 PROCEDURE — 25500020 PHARM REV CODE 255: Performed by: PHYSICIAN ASSISTANT

## 2025-03-18 PROCEDURE — 77048 MRI BREAST C-+ W/CAD UNI: CPT | Mod: TC

## 2025-03-18 PROCEDURE — A9577 INJ MULTIHANCE: HCPCS | Performed by: PHYSICIAN ASSISTANT

## 2025-03-18 PROCEDURE — 77048 MRI BREAST C-+ W/CAD UNI: CPT | Mod: 26,,, | Performed by: RADIOLOGY

## 2025-03-18 RX ORDER — LIDOCAINE HYDROCHLORIDE 10 MG/ML
3 INJECTION, SOLUTION INFILTRATION; PERINEURAL ONCE
Status: DISCONTINUED | OUTPATIENT
Start: 2025-03-18 | End: 2025-03-19 | Stop reason: HOSPADM

## 2025-03-18 RX ORDER — LIDOCAINE HYDROCHLORIDE AND EPINEPHRINE 10; 20 UG/ML; MG/ML
20 INJECTION, SOLUTION INFILTRATION; PERINEURAL ONCE
Status: DISCONTINUED | OUTPATIENT
Start: 2025-03-18 | End: 2025-03-19 | Stop reason: HOSPADM

## 2025-03-18 RX ADMIN — GADOBENATE DIMEGLUMINE 11 ML: 529 INJECTION, SOLUTION INTRAVENOUS at 09:03

## 2025-08-05 ENCOUNTER — PATIENT MESSAGE (OUTPATIENT)
Dept: OBSTETRICS AND GYNECOLOGY | Facility: CLINIC | Age: 50
End: 2025-08-05
Payer: COMMERCIAL

## 2025-08-05 DIAGNOSIS — R53.83 FATIGUE, UNSPECIFIED TYPE: ICD-10-CM

## 2025-08-05 DIAGNOSIS — N95.1 MENOPAUSAL SYMPTOMS: Primary | ICD-10-CM

## 2025-08-06 ENCOUNTER — TELEPHONE (OUTPATIENT)
Dept: OBSTETRICS AND GYNECOLOGY | Facility: CLINIC | Age: 50
End: 2025-08-06
Payer: COMMERCIAL

## 2025-08-06 NOTE — TELEPHONE ENCOUNTER
LVM for patient regarding mychart message. Ammy has asked to schedule labs and follow up appointment.

## 2025-08-07 ENCOUNTER — TELEPHONE (OUTPATIENT)
Dept: OBSTETRICS AND GYNECOLOGY | Facility: CLINIC | Age: 50
End: 2025-08-07
Payer: COMMERCIAL

## 2025-08-07 NOTE — TELEPHONE ENCOUNTER
-11/27/2023  Lov-11/03/2023   Copied from CRM #[id number]. Topic: General Inquiry - Return Call  >> Aug 6, 2025  2:59 PM Estefanía wrote:  Type:  Patient Returning Call    Who Called:Linda  Who Left Message for Patient:Richa  Does the patient know what this is regarding?:yes  Would the patient rather a call back or a response via [facility]? call  Best Call Back Number:[phone]  Additional Information:

## 2025-08-07 NOTE — TELEPHONE ENCOUNTER
Spoke with patient and scheduled lab appointment and follow up. Patient has no further questions or complaints.

## 2025-08-20 ENCOUNTER — LAB VISIT (OUTPATIENT)
Dept: LAB | Facility: HOSPITAL | Age: 50
End: 2025-08-20
Attending: PHYSICIAN ASSISTANT
Payer: COMMERCIAL

## 2025-08-20 DIAGNOSIS — N95.1 MENOPAUSAL SYMPTOMS: ICD-10-CM

## 2025-08-20 DIAGNOSIS — R53.83 FATIGUE, UNSPECIFIED TYPE: ICD-10-CM

## 2025-08-20 LAB
ABSOLUTE EOSINOPHIL (OHS): 0.12 K/UL
ABSOLUTE MONOCYTE (OHS): 0.38 K/UL (ref 0.3–1)
ABSOLUTE NEUTROPHIL COUNT (OHS): 4.42 K/UL (ref 1.8–7.7)
ALBUMIN SERPL BCP-MCNC: 4.6 G/DL (ref 3.5–5.2)
ALP SERPL-CCNC: 49 UNIT/L (ref 40–150)
ALT SERPL W/O P-5'-P-CCNC: 12 UNIT/L (ref 10–44)
ANION GAP (OHS): 9 MMOL/L (ref 8–16)
AST SERPL-CCNC: 23 UNIT/L (ref 11–45)
BASOPHILS # BLD AUTO: 0.07 K/UL
BASOPHILS NFR BLD AUTO: 1.1 %
BILIRUB SERPL-MCNC: 0.7 MG/DL (ref 0.1–1)
BUN SERPL-MCNC: 13 MG/DL (ref 6–20)
CALCIUM SERPL-MCNC: 9.5 MG/DL (ref 8.7–10.5)
CHLORIDE SERPL-SCNC: 106 MMOL/L (ref 95–110)
CO2 SERPL-SCNC: 27 MMOL/L (ref 23–29)
CREAT SERPL-MCNC: 0.8 MG/DL (ref 0.5–1.4)
ERYTHROCYTE [DISTWIDTH] IN BLOOD BY AUTOMATED COUNT: 12.4 % (ref 11.5–14.5)
ESTRADIOL SERPL HS-MCNC: 68 PG/ML
FERRITIN SERPL-MCNC: 94 NG/ML (ref 20–300)
GFR SERPLBLD CREATININE-BSD FMLA CKD-EPI: >60 ML/MIN/1.73/M2
GLUCOSE SERPL-MCNC: 86 MG/DL (ref 70–110)
HCT VFR BLD AUTO: 44 % (ref 37–48.5)
HGB BLD-MCNC: 15.2 GM/DL (ref 12–16)
IMM GRANULOCYTES # BLD AUTO: 0.02 K/UL (ref 0–0.04)
IMM GRANULOCYTES NFR BLD AUTO: 0.3 % (ref 0–0.5)
IRON SATN MFR SERPL: 42 % (ref 20–50)
IRON SERPL-MCNC: 131 UG/DL (ref 30–160)
LYMPHOCYTES # BLD AUTO: 1.48 K/UL (ref 1–4.8)
MCH RBC QN AUTO: 30.7 PG (ref 27–31)
MCHC RBC AUTO-ENTMCNC: 34.5 G/DL (ref 32–36)
MCV RBC AUTO: 89 FL (ref 82–98)
NUCLEATED RBC (/100WBC) (OHS): 0 /100 WBC
PLATELET # BLD AUTO: 217 K/UL (ref 150–450)
PMV BLD AUTO: 9.4 FL (ref 9.2–12.9)
POTASSIUM SERPL-SCNC: 4.1 MMOL/L (ref 3.5–5.1)
PROT SERPL-MCNC: 7.3 GM/DL (ref 6–8.4)
RBC # BLD AUTO: 4.95 M/UL (ref 4–5.4)
RELATIVE EOSINOPHIL (OHS): 1.8 %
RELATIVE LYMPHOCYTE (OHS): 22.8 % (ref 18–48)
RELATIVE MONOCYTE (OHS): 5.9 % (ref 4–15)
RELATIVE NEUTROPHIL (OHS): 68.1 % (ref 38–73)
SODIUM SERPL-SCNC: 142 MMOL/L (ref 136–145)
TIBC SERPL-MCNC: 314 UG/DL (ref 250–450)
TRANSFERRIN SERPL-MCNC: 212 MG/DL (ref 200–375)
TSH SERPL-ACNC: 1.02 UIU/ML (ref 0.4–4)
WBC # BLD AUTO: 6.49 K/UL (ref 3.9–12.7)

## 2025-08-20 PROCEDURE — 82670 ASSAY OF TOTAL ESTRADIOL: CPT

## 2025-08-20 PROCEDURE — 84443 ASSAY THYROID STIM HORMONE: CPT

## 2025-08-20 PROCEDURE — 84460 ALANINE AMINO (ALT) (SGPT): CPT

## 2025-08-20 PROCEDURE — 85025 COMPLETE CBC W/AUTO DIFF WBC: CPT

## 2025-08-20 PROCEDURE — 84403 ASSAY OF TOTAL TESTOSTERONE: CPT

## 2025-08-20 PROCEDURE — 84466 ASSAY OF TRANSFERRIN: CPT

## 2025-08-20 PROCEDURE — 36415 COLL VENOUS BLD VENIPUNCTURE: CPT

## 2025-08-20 PROCEDURE — 82728 ASSAY OF FERRITIN: CPT

## 2025-08-21 LAB — TESTOST SERPL-MCNC: 20 NG/DL (ref 5–73)

## (undated) DEVICE — ADHESIVE DERMABOND ADVANCED

## (undated) DEVICE — IRRIGATOR ENDOSCOPY DISP.

## (undated) DEVICE — GLOVE BIOGEL SKINSENSE PI 6.5

## (undated) DEVICE — SEAL UNIVERSAL 5MM-8MM XI

## (undated) DEVICE — COVER TIP CURVED SCISSORS XI

## (undated) DEVICE — INSERT CUSHIONPRONE VIEW LARGE

## (undated) DEVICE — SOL PVP-I SCRUB 7.5% 4OZ

## (undated) DEVICE — SUT V-LOC ABSRB WOUND CLSR

## (undated) DEVICE — SUT MCRYL PLUS 4-0 PS2 27IN

## (undated) DEVICE — ELECTRODE REM PLYHSV RETURN 9

## (undated) DEVICE — CONTAINER SPECIMEN OR STER 4OZ

## (undated) DEVICE — BAG TISS RETRV MONARCH 10MM

## (undated) DEVICE — SYS SEE SHARP SCOPE ANTIFOG

## (undated) DEVICE — PORT ACCESS 8MM W/120MM LOW

## (undated) DEVICE — SEE MEDLINE ITEM 156923

## (undated) DEVICE — MANIPULATOR VCARE PLUS 34MM

## (undated) DEVICE — SYR 10CC LUER LOCK

## (undated) DEVICE — OBTURATOR BLADELESS 8MM XI CLR

## (undated) DEVICE — DRAPE ARM DAVINCI XI

## (undated) DEVICE — SOL BETADINE 5%

## (undated) DEVICE — SET TRI-LUMEN FILTERED TUBE

## (undated) DEVICE — SUT PROLENE 0 CT1 30IN BLUE

## (undated) DEVICE — SOL NS 1000CC

## (undated) DEVICE — SOL WATER STRL IRR 1000ML

## (undated) DEVICE — DEVICE ANC SW STAT FOLEY 6-24

## (undated) DEVICE — KIT WING PAD POSITIONING

## (undated) DEVICE — JELLY SURGILUBE 5GR

## (undated) DEVICE — SOL ELECTROLUBE ANTI-STIC

## (undated) DEVICE — DRAPE COLUMN DAVINCI XI

## (undated) DEVICE — NDL INSUFFLATION VERRES 120MM